# Patient Record
Sex: FEMALE | Race: WHITE | Employment: FULL TIME | URBAN - METROPOLITAN AREA
[De-identification: names, ages, dates, MRNs, and addresses within clinical notes are randomized per-mention and may not be internally consistent; named-entity substitution may affect disease eponyms.]

---

## 2017-06-07 ENCOUNTER — HOSPITAL ENCOUNTER (OUTPATIENT)
Dept: GENERAL RADIOLOGY | Age: 47
Discharge: HOME | End: 2017-06-07

## 2017-06-07 DIAGNOSIS — R05.9 COUGH: ICD-10-CM

## 2017-06-07 PROCEDURE — 71020 XR CHEST PA LAT: CPT

## 2017-12-11 ENCOUNTER — ANESTHESIA (OUTPATIENT)
Dept: MEDSURG UNIT | Age: 47
End: 2017-12-11
Payer: COMMERCIAL

## 2017-12-11 ENCOUNTER — ANESTHESIA EVENT (OUTPATIENT)
Dept: MEDSURG UNIT | Age: 47
End: 2017-12-11
Payer: COMMERCIAL

## 2017-12-11 ENCOUNTER — HOSPITAL ENCOUNTER (OUTPATIENT)
Age: 47
Setting detail: OUTPATIENT SURGERY
Discharge: HOME | End: 2017-12-11
Attending: OTOLARYNGOLOGY | Admitting: OTOLARYNGOLOGY
Payer: COMMERCIAL

## 2017-12-11 VITALS
HEART RATE: 76 BPM | BODY MASS INDEX: 21.09 KG/M2 | OXYGEN SATURATION: 96 % | HEIGHT: 63 IN | SYSTOLIC BLOOD PRESSURE: 103 MMHG | RESPIRATION RATE: 19 BRPM | TEMPERATURE: 98.4 F | DIASTOLIC BLOOD PRESSURE: 67 MMHG | WEIGHT: 119 LBS

## 2017-12-11 LAB
HCG UR QL: NEGATIVE
HGB BLD-MCNC: 13.8 G/DL (ref 11.5–16)

## 2017-12-11 PROCEDURE — 74011250636 HC RX REV CODE- 250/636: Performed by: ANESTHESIOLOGY

## 2017-12-11 PROCEDURE — 74011250637 HC RX REV CODE- 250/637

## 2017-12-11 PROCEDURE — 77030008355 HC SPLNT NSL EXT MEDT -B: Performed by: OTOLARYNGOLOGY

## 2017-12-11 PROCEDURE — 77030020782 HC GWN BAIR PAWS FLX 3M -B

## 2017-12-11 PROCEDURE — 77030020360 HC SOL INJ D10 LFCR 250ML BG LF: Performed by: OTOLARYNGOLOGY

## 2017-12-11 PROCEDURE — 74011000250 HC RX REV CODE- 250

## 2017-12-11 PROCEDURE — C1762 CONN TISS, HUMAN(INC FASCIA): HCPCS | Performed by: OTOLARYNGOLOGY

## 2017-12-11 PROCEDURE — 74011250636 HC RX REV CODE- 250/636

## 2017-12-11 PROCEDURE — 77030026438 HC STYL ET INTUB CARD -A: Performed by: NURSE ANESTHETIST, CERTIFIED REGISTERED

## 2017-12-11 PROCEDURE — 77030008684 HC TU ET CUF COVD -B: Performed by: NURSE ANESTHETIST, CERTIFIED REGISTERED

## 2017-12-11 PROCEDURE — 81025 URINE PREGNANCY TEST: CPT

## 2017-12-11 PROCEDURE — 76060000067 HC AMB SURG ANES 3.5 TO 4 HR: Performed by: OTOLARYNGOLOGY

## 2017-12-11 PROCEDURE — 74011000250 HC RX REV CODE- 250: Performed by: OTOLARYNGOLOGY

## 2017-12-11 PROCEDURE — 77030021668 HC NEB PREFIL KT VYRM -A

## 2017-12-11 PROCEDURE — 77030020269 HC MISC IMPL: Performed by: OTOLARYNGOLOGY

## 2017-12-11 PROCEDURE — 77030013079 HC BLNKT BAIR HGGR 3M -A: Performed by: NURSE ANESTHETIST, CERTIFIED REGISTERED

## 2017-12-11 PROCEDURE — 74011250636 HC RX REV CODE- 250/636: Performed by: OTOLARYNGOLOGY

## 2017-12-11 PROCEDURE — 76030000007 HC AMB SURG OR TIME 3.5 TO 4: Performed by: OTOLARYNGOLOGY

## 2017-12-11 PROCEDURE — 85018 HEMOGLOBIN: CPT

## 2017-12-11 PROCEDURE — 76210000036 HC AMBSU PH I REC 1.5 TO 2 HR: Performed by: OTOLARYNGOLOGY

## 2017-12-11 PROCEDURE — 77030002966 HC SUT PDS J&J -A: Performed by: OTOLARYNGOLOGY

## 2017-12-11 RX ORDER — SODIUM CHLORIDE 9 MG/ML
25 INJECTION, SOLUTION INTRAVENOUS CONTINUOUS
Status: DISCONTINUED | OUTPATIENT
Start: 2017-12-11 | End: 2017-12-11 | Stop reason: HOSPADM

## 2017-12-11 RX ORDER — CLINDAMYCIN PHOSPHATE 900 MG/50ML
900 INJECTION INTRAVENOUS ONCE
Status: COMPLETED | OUTPATIENT
Start: 2017-12-11 | End: 2017-12-11

## 2017-12-11 RX ORDER — SODIUM CHLORIDE 0.9 % (FLUSH) 0.9 %
5-10 SYRINGE (ML) INJECTION AS NEEDED
Status: DISCONTINUED | OUTPATIENT
Start: 2017-12-11 | End: 2017-12-11 | Stop reason: HOSPADM

## 2017-12-11 RX ORDER — OXYMETAZOLINE HCL 0.05 %
2 SPRAY, NON-AEROSOL (ML) NASAL
Status: DISCONTINUED | OUTPATIENT
Start: 2017-12-11 | End: 2017-12-11 | Stop reason: HOSPADM

## 2017-12-11 RX ORDER — GLYCOPYRROLATE 0.2 MG/ML
INJECTION INTRAMUSCULAR; INTRAVENOUS AS NEEDED
Status: DISCONTINUED | OUTPATIENT
Start: 2017-12-11 | End: 2017-12-11 | Stop reason: HOSPADM

## 2017-12-11 RX ORDER — SODIUM CHLORIDE, SODIUM LACTATE, POTASSIUM CHLORIDE, CALCIUM CHLORIDE 600; 310; 30; 20 MG/100ML; MG/100ML; MG/100ML; MG/100ML
1000 INJECTION, SOLUTION INTRAVENOUS CONTINUOUS
Status: DISCONTINUED | OUTPATIENT
Start: 2017-12-11 | End: 2017-12-11 | Stop reason: HOSPADM

## 2017-12-11 RX ORDER — OXYCODONE AND ACETAMINOPHEN 5; 325 MG/1; MG/1
1 TABLET ORAL
Status: CANCELLED | OUTPATIENT
Start: 2017-12-11

## 2017-12-11 RX ORDER — HYDROMORPHONE HYDROCHLORIDE 2 MG/ML
INJECTION, SOLUTION INTRAMUSCULAR; INTRAVENOUS; SUBCUTANEOUS AS NEEDED
Status: DISCONTINUED | OUTPATIENT
Start: 2017-12-11 | End: 2017-12-11 | Stop reason: HOSPADM

## 2017-12-11 RX ORDER — EPHEDRINE SULFATE 50 MG/ML
5 INJECTION, SOLUTION INTRAVENOUS AS NEEDED
Status: DISCONTINUED | OUTPATIENT
Start: 2017-12-11 | End: 2017-12-11 | Stop reason: HOSPADM

## 2017-12-11 RX ORDER — MORPHINE SULFATE 2 MG/ML
2 INJECTION, SOLUTION INTRAMUSCULAR; INTRAVENOUS
Status: CANCELLED | OUTPATIENT
Start: 2017-12-11

## 2017-12-11 RX ORDER — PHENYLEPHRINE HCL IN 0.9% NACL 0.4MG/10ML
SYRINGE (ML) INTRAVENOUS AS NEEDED
Status: DISCONTINUED | OUTPATIENT
Start: 2017-12-11 | End: 2017-12-11 | Stop reason: HOSPADM

## 2017-12-11 RX ORDER — ROCURONIUM BROMIDE 10 MG/ML
INJECTION, SOLUTION INTRAVENOUS AS NEEDED
Status: DISCONTINUED | OUTPATIENT
Start: 2017-12-11 | End: 2017-12-11 | Stop reason: HOSPADM

## 2017-12-11 RX ORDER — ONDANSETRON 8 MG/1
8 TABLET, ORALLY DISINTEGRATING ORAL
Qty: 5 TAB | Refills: 1 | Status: SHIPPED | OUTPATIENT
Start: 2017-12-11 | End: 2019-01-22

## 2017-12-11 RX ORDER — OXYCODONE HYDROCHLORIDE 5 MG/1
5 TABLET ORAL AS NEEDED
Status: DISCONTINUED | OUTPATIENT
Start: 2017-12-11 | End: 2017-12-11 | Stop reason: HOSPADM

## 2017-12-11 RX ORDER — SUCCINYLCHOLINE CHLORIDE 20 MG/ML
INJECTION INTRAMUSCULAR; INTRAVENOUS AS NEEDED
Status: DISCONTINUED | OUTPATIENT
Start: 2017-12-11 | End: 2017-12-11 | Stop reason: HOSPADM

## 2017-12-11 RX ORDER — MIDAZOLAM HYDROCHLORIDE 1 MG/ML
1 INJECTION, SOLUTION INTRAMUSCULAR; INTRAVENOUS AS NEEDED
Status: DISCONTINUED | OUTPATIENT
Start: 2017-12-11 | End: 2017-12-11 | Stop reason: HOSPADM

## 2017-12-11 RX ORDER — CLINDAMYCIN HYDROCHLORIDE 300 MG/1
300 CAPSULE ORAL 3 TIMES DAILY
Qty: 21 CAP | Refills: 0 | Status: SHIPPED | OUTPATIENT
Start: 2017-12-11 | End: 2017-12-18

## 2017-12-11 RX ORDER — SODIUM CHLORIDE 0.9 % (FLUSH) 0.9 %
5-10 SYRINGE (ML) INJECTION AS NEEDED
Status: CANCELLED | OUTPATIENT
Start: 2017-12-11

## 2017-12-11 RX ORDER — SODIUM CHLORIDE 0.9 % (FLUSH) 0.9 %
5-10 SYRINGE (ML) INJECTION EVERY 8 HOURS
Status: DISCONTINUED | OUTPATIENT
Start: 2017-12-11 | End: 2017-12-11 | Stop reason: HOSPADM

## 2017-12-11 RX ORDER — FENTANYL CITRATE 50 UG/ML
50 INJECTION, SOLUTION INTRAMUSCULAR; INTRAVENOUS AS NEEDED
Status: DISCONTINUED | OUTPATIENT
Start: 2017-12-11 | End: 2017-12-11 | Stop reason: HOSPADM

## 2017-12-11 RX ORDER — SODIUM CHLORIDE 0.9 % (FLUSH) 0.9 %
5-10 SYRINGE (ML) INJECTION EVERY 8 HOURS
Status: CANCELLED | OUTPATIENT
Start: 2017-12-11

## 2017-12-11 RX ORDER — SODIUM CHLORIDE, SODIUM LACTATE, POTASSIUM CHLORIDE, CALCIUM CHLORIDE 600; 310; 30; 20 MG/100ML; MG/100ML; MG/100ML; MG/100ML
100 INJECTION, SOLUTION INTRAVENOUS CONTINUOUS
Status: DISCONTINUED | OUTPATIENT
Start: 2017-12-11 | End: 2017-12-11 | Stop reason: HOSPADM

## 2017-12-11 RX ORDER — DEXAMETHASONE SODIUM PHOSPHATE 4 MG/ML
8 INJECTION, SOLUTION INTRA-ARTICULAR; INTRALESIONAL; INTRAMUSCULAR; INTRAVENOUS; SOFT TISSUE ONCE
Status: COMPLETED | OUTPATIENT
Start: 2017-12-11 | End: 2017-12-11

## 2017-12-11 RX ORDER — MORPHINE SULFATE 10 MG/ML
2 INJECTION, SOLUTION INTRAMUSCULAR; INTRAVENOUS
Status: DISCONTINUED | OUTPATIENT
Start: 2017-12-11 | End: 2017-12-11 | Stop reason: HOSPADM

## 2017-12-11 RX ORDER — ONDANSETRON 2 MG/ML
INJECTION INTRAMUSCULAR; INTRAVENOUS AS NEEDED
Status: DISCONTINUED | OUTPATIENT
Start: 2017-12-11 | End: 2017-12-11 | Stop reason: HOSPADM

## 2017-12-11 RX ORDER — SCOLOPAMINE TRANSDERMAL SYSTEM 1 MG/1
PATCH, EXTENDED RELEASE TRANSDERMAL AS NEEDED
Status: DISCONTINUED | OUTPATIENT
Start: 2017-12-11 | End: 2017-12-11 | Stop reason: HOSPADM

## 2017-12-11 RX ORDER — DIPHENHYDRAMINE HYDROCHLORIDE 50 MG/ML
12.5 INJECTION, SOLUTION INTRAMUSCULAR; INTRAVENOUS AS NEEDED
Status: DISCONTINUED | OUTPATIENT
Start: 2017-12-11 | End: 2017-12-11 | Stop reason: HOSPADM

## 2017-12-11 RX ORDER — MIDAZOLAM HYDROCHLORIDE 1 MG/ML
0.5 INJECTION, SOLUTION INTRAMUSCULAR; INTRAVENOUS
Status: DISCONTINUED | OUTPATIENT
Start: 2017-12-11 | End: 2017-12-11 | Stop reason: HOSPADM

## 2017-12-11 RX ORDER — ROPIVACAINE HYDROCHLORIDE 5 MG/ML
150 INJECTION, SOLUTION EPIDURAL; INFILTRATION; PERINEURAL AS NEEDED
Status: DISCONTINUED | OUTPATIENT
Start: 2017-12-11 | End: 2017-12-11 | Stop reason: HOSPADM

## 2017-12-11 RX ORDER — ACETAMINOPHEN 10 MG/ML
INJECTION, SOLUTION INTRAVENOUS AS NEEDED
Status: DISCONTINUED | OUTPATIENT
Start: 2017-12-11 | End: 2017-12-11 | Stop reason: HOSPADM

## 2017-12-11 RX ORDER — LIDOCAINE HYDROCHLORIDE 10 MG/ML
0.1 INJECTION, SOLUTION EPIDURAL; INFILTRATION; INTRACAUDAL; PERINEURAL AS NEEDED
Status: DISCONTINUED | OUTPATIENT
Start: 2017-12-11 | End: 2017-12-11 | Stop reason: HOSPADM

## 2017-12-11 RX ORDER — FENTANYL CITRATE 50 UG/ML
INJECTION, SOLUTION INTRAMUSCULAR; INTRAVENOUS AS NEEDED
Status: DISCONTINUED | OUTPATIENT
Start: 2017-12-11 | End: 2017-12-11 | Stop reason: HOSPADM

## 2017-12-11 RX ORDER — LIDOCAINE HYDROCHLORIDE 20 MG/ML
INJECTION, SOLUTION EPIDURAL; INFILTRATION; INTRACAUDAL; PERINEURAL AS NEEDED
Status: DISCONTINUED | OUTPATIENT
Start: 2017-12-11 | End: 2017-12-11 | Stop reason: HOSPADM

## 2017-12-11 RX ORDER — PROPOFOL 10 MG/ML
INJECTION, EMULSION INTRAVENOUS AS NEEDED
Status: DISCONTINUED | OUTPATIENT
Start: 2017-12-11 | End: 2017-12-11 | Stop reason: HOSPADM

## 2017-12-11 RX ORDER — LIDOCAINE HYDROCHLORIDE AND EPINEPHRINE 10; 10 MG/ML; UG/ML
INJECTION, SOLUTION INFILTRATION; PERINEURAL AS NEEDED
Status: DISCONTINUED | OUTPATIENT
Start: 2017-12-11 | End: 2017-12-11 | Stop reason: HOSPADM

## 2017-12-11 RX ORDER — ONDANSETRON 2 MG/ML
4 INJECTION INTRAMUSCULAR; INTRAVENOUS AS NEEDED
Status: DISCONTINUED | OUTPATIENT
Start: 2017-12-11 | End: 2017-12-11 | Stop reason: HOSPADM

## 2017-12-11 RX ORDER — MIDAZOLAM HYDROCHLORIDE 1 MG/ML
INJECTION, SOLUTION INTRAMUSCULAR; INTRAVENOUS AS NEEDED
Status: DISCONTINUED | OUTPATIENT
Start: 2017-12-11 | End: 2017-12-11 | Stop reason: HOSPADM

## 2017-12-11 RX ORDER — BACITRACIN ZINC 500 UNIT/G
OINTMENT (GRAM) TOPICAL AS NEEDED
Status: DISCONTINUED | OUTPATIENT
Start: 2017-12-11 | End: 2017-12-11 | Stop reason: HOSPADM

## 2017-12-11 RX ORDER — FENTANYL CITRATE 50 UG/ML
25 INJECTION, SOLUTION INTRAMUSCULAR; INTRAVENOUS
Status: DISCONTINUED | OUTPATIENT
Start: 2017-12-11 | End: 2017-12-11 | Stop reason: HOSPADM

## 2017-12-11 RX ORDER — SODIUM CHLORIDE, SODIUM LACTATE, POTASSIUM CHLORIDE, CALCIUM CHLORIDE 600; 310; 30; 20 MG/100ML; MG/100ML; MG/100ML; MG/100ML
INJECTION, SOLUTION INTRAVENOUS
Status: DISCONTINUED | OUTPATIENT
Start: 2017-12-11 | End: 2017-12-11 | Stop reason: HOSPADM

## 2017-12-11 RX ORDER — OXYCODONE AND ACETAMINOPHEN 5; 325 MG/1; MG/1
2 TABLET ORAL
Qty: 30 TAB | Refills: 0 | Status: SHIPPED | OUTPATIENT
Start: 2017-12-11 | End: 2019-01-22

## 2017-12-11 RX ORDER — HYDROCODONE BITARTRATE AND ACETAMINOPHEN 5; 325 MG/1; MG/1
2 TABLET ORAL
Qty: 30 TAB | Refills: 0 | Status: SHIPPED | OUTPATIENT
Start: 2017-12-11 | End: 2019-01-22

## 2017-12-11 RX ORDER — CEFAZOLIN SODIUM/WATER 2 G/20 ML
2 SYRINGE (ML) INTRAVENOUS ONCE
Status: DISCONTINUED | OUTPATIENT
Start: 2017-12-11 | End: 2017-12-11

## 2017-12-11 RX ADMIN — GLYCOPYRROLATE 0.2 MG: 0.2 INJECTION INTRAMUSCULAR; INTRAVENOUS at 14:01

## 2017-12-11 RX ADMIN — ROCURONIUM BROMIDE 5 MG: 10 INJECTION, SOLUTION INTRAVENOUS at 13:18

## 2017-12-11 RX ADMIN — Medication 40 MCG: at 14:14

## 2017-12-11 RX ADMIN — SUCCINYLCHOLINE CHLORIDE 160 MG: 20 INJECTION INTRAMUSCULAR; INTRAVENOUS at 13:18

## 2017-12-11 RX ADMIN — ROCURONIUM BROMIDE 10 MG: 10 INJECTION, SOLUTION INTRAVENOUS at 14:43

## 2017-12-11 RX ADMIN — ROCURONIUM BROMIDE 10 MG: 10 INJECTION, SOLUTION INTRAVENOUS at 14:55

## 2017-12-11 RX ADMIN — HYDROMORPHONE HYDROCHLORIDE 0.4 MG: 2 INJECTION, SOLUTION INTRAMUSCULAR; INTRAVENOUS; SUBCUTANEOUS at 16:59

## 2017-12-11 RX ADMIN — Medication 40 MCG: at 14:55

## 2017-12-11 RX ADMIN — PROPOFOL 50 MG: 10 INJECTION, EMULSION INTRAVENOUS at 14:44

## 2017-12-11 RX ADMIN — ROCURONIUM BROMIDE 10 MG: 10 INJECTION, SOLUTION INTRAVENOUS at 16:10

## 2017-12-11 RX ADMIN — PROPOFOL 200 MG: 10 INJECTION, EMULSION INTRAVENOUS at 13:18

## 2017-12-11 RX ADMIN — Medication 80 MCG: at 14:00

## 2017-12-11 RX ADMIN — ROCURONIUM BROMIDE 10 MG: 10 INJECTION, SOLUTION INTRAVENOUS at 15:19

## 2017-12-11 RX ADMIN — SODIUM CHLORIDE, SODIUM LACTATE, POTASSIUM CHLORIDE, AND CALCIUM CHLORIDE 1000 ML: 600; 310; 30; 20 INJECTION, SOLUTION INTRAVENOUS at 12:30

## 2017-12-11 RX ADMIN — HYDROMORPHONE HYDROCHLORIDE 0.4 MG: 2 INJECTION, SOLUTION INTRAMUSCULAR; INTRAVENOUS; SUBCUTANEOUS at 17:10

## 2017-12-11 RX ADMIN — ROCURONIUM BROMIDE 10 MG: 10 INJECTION, SOLUTION INTRAVENOUS at 15:44

## 2017-12-11 RX ADMIN — PROPOFOL 50 MG: 10 INJECTION, EMULSION INTRAVENOUS at 16:36

## 2017-12-11 RX ADMIN — Medication 40 MCG: at 14:39

## 2017-12-11 RX ADMIN — HYDROMORPHONE HYDROCHLORIDE 0.4 MG: 2 INJECTION, SOLUTION INTRAMUSCULAR; INTRAVENOUS; SUBCUTANEOUS at 14:43

## 2017-12-11 RX ADMIN — SODIUM CHLORIDE, SODIUM LACTATE, POTASSIUM CHLORIDE, CALCIUM CHLORIDE: 600; 310; 30; 20 INJECTION, SOLUTION INTRAVENOUS at 13:06

## 2017-12-11 RX ADMIN — Medication 40 MCG: at 16:11

## 2017-12-11 RX ADMIN — Medication 40 MCG: at 16:10

## 2017-12-11 RX ADMIN — Medication 40 MCG: at 15:19

## 2017-12-11 RX ADMIN — LIDOCAINE HYDROCHLORIDE 40 MG: 20 INJECTION, SOLUTION EPIDURAL; INFILTRATION; INTRACAUDAL; PERINEURAL at 13:17

## 2017-12-11 RX ADMIN — SODIUM CHLORIDE, SODIUM LACTATE, POTASSIUM CHLORIDE, CALCIUM CHLORIDE: 600; 310; 30; 20 INJECTION, SOLUTION INTRAVENOUS at 15:53

## 2017-12-11 RX ADMIN — FENTANYL CITRATE 50 MCG: 50 INJECTION, SOLUTION INTRAMUSCULAR; INTRAVENOUS at 13:13

## 2017-12-11 RX ADMIN — ACETAMINOPHEN 1000 MG: 10 INJECTION, SOLUTION INTRAVENOUS at 13:37

## 2017-12-11 RX ADMIN — Medication 40 MCG: at 16:35

## 2017-12-11 RX ADMIN — PROPOFOL 50 MG: 10 INJECTION, EMULSION INTRAVENOUS at 16:43

## 2017-12-11 RX ADMIN — ROCURONIUM BROMIDE 10 MG: 10 INJECTION, SOLUTION INTRAVENOUS at 14:03

## 2017-12-11 RX ADMIN — HYDROMORPHONE HYDROCHLORIDE 0.4 MG: 2 INJECTION, SOLUTION INTRAMUSCULAR; INTRAVENOUS; SUBCUTANEOUS at 17:06

## 2017-12-11 RX ADMIN — ONDANSETRON 4 MG: 2 INJECTION INTRAMUSCULAR; INTRAVENOUS at 16:39

## 2017-12-11 RX ADMIN — CLINDAMYCIN PHOSPHATE 900 MG: 18 INJECTION, SOLUTION INTRAMUSCULAR; INTRAVENOUS at 13:31

## 2017-12-11 RX ADMIN — ONDANSETRON 4 MG: 2 INJECTION INTRAMUSCULAR; INTRAVENOUS at 13:27

## 2017-12-11 RX ADMIN — DEXAMETHASONE SODIUM PHOSPHATE 10 MG: 4 INJECTION INTRA-ARTICULAR; INTRALESIONAL; INTRAMUSCULAR; INTRAVENOUS; SOFT TISSUE at 13:27

## 2017-12-11 RX ADMIN — SCOLOPAMINE TRANSDERMAL SYSTEM 1 PATCH: 1 PATCH, EXTENDED RELEASE TRANSDERMAL at 12:55

## 2017-12-11 RX ADMIN — MIDAZOLAM HYDROCHLORIDE 2 MG: 1 INJECTION, SOLUTION INTRAMUSCULAR; INTRAVENOUS at 13:00

## 2017-12-11 RX ADMIN — FENTANYL CITRATE 50 MCG: 50 INJECTION, SOLUTION INTRAMUSCULAR; INTRAVENOUS at 13:06

## 2017-12-11 RX ADMIN — Medication 80 MCG: at 15:50

## 2017-12-11 RX ADMIN — HYDROMORPHONE HYDROCHLORIDE 0.4 MG: 2 INJECTION, SOLUTION INTRAMUSCULAR; INTRAVENOUS; SUBCUTANEOUS at 15:52

## 2017-12-11 NOTE — DISCHARGE INSTRUCTIONS
Nasal Septum Repair: What to Expect at [address] may have some swelling of your nose, upper lip, cheeks, or around your eyes after nasal surgery. You may have some bruises around your nose and eyes. Your nose may be sore and will bleed. This may last for several days after surgery. The tip of your nose and your upper lip and gums may be numb. Feeling will return in a few weeks to a few months. Your sense of smell may not be as good after surgery. But it will improve and will often return to normal in 1 to 2 months. You will have a drip pad under your nose to collect mucus and blood. Change it only when it bleeds through. You may have to do this every hour for 24 hours after surgery. You will probably be able to return to work or school in a few days and to your normal routine in about 3 weeks. But this varies with your job and how much surgery you had. Most people recover fully in 1 to 2 months. You will have to visit your doctor during the 3 to 4 months after your surgery. Your doctor will check to see that your nose is healing well. This care sheet gives you a general idea about how long it will take for you to recover. But each person recovers at a different pace. Follow the steps below to get better as quickly as possible. How can you care for yourself at home? Activity  ? · Rest when you feel tired. Getting enough sleep will help you recover. Do not lie flat. Raise your head with two or three pillows. This can reduce swelling. Try to sleep on your back for the month after surgery. You can also sleep in a reclining chair. ? · Try to walk each day. Start by walking a little more than you did the day before. Bit by bit, increase the amount you walk. Walking boosts blood flow and helps prevent pneumonia and constipation. Also, try to sit and stand as much as you can.   ? · For 1 week, try not to bend over or lift anything heavier than 10 pounds.  This may include a child, heavy grocery bags and milk containers, a heavy briefcase or backpack, cat litter or dog food bags, or a vacuum . ? · You can take a shower or bath. Avoid swimming for 6 weeks. ? · Avoid strenuous activities, such as bicycle riding, jogging, weight lifting, or aerobic exercise, for 1 week or until your doctor says it is okay. ? · You may drive when you are no longer taking prescription pain pills and feel up to it. Diet  ? · You can eat your normal diet. If your stomach is upset, try bland, low-fat foods like plain rice, broiled chicken, toast, and yogurt. ? · You may notice that your bowel movements are not regular right after your surgery. This is common. Try to avoid constipation and straining with bowel movements. You may want to take a fiber supplement every day. If you have not had a bowel movement after a couple of days, ask your doctor about taking a mild laxative. Medicines  ? · Your doctor will tell you if and when you can restart your medicines. He or she will also give you instructions about taking any new medicines. ? · If you take blood thinners, such as warfarin (Coumadin), clopidogrel (Plavix), or aspirin, be sure to talk to your doctor. He or she will tell you if and when to start taking those medicines again. Make sure that you understand exactly what your doctor wants you to do. ? · Do not take aspirin, aspirin-containing medicines, or anti-inflammatory medicines such as ibuprofen (Advil, Motrin) or naproxen (Aleve) for 3 weeks following surgery unless your doctor says it is okay. ? · Take pain medicines exactly as directed. ¨ If the doctor gave you a prescription medicine for pain, take it as prescribed. ¨ Do not take two or more pain medicines at the same time unless the doctor told you to. Many pain medicines have acetaminophen, which is Tylenol. Too much acetaminophen (Tylenol) can be harmful. ? · If your doctor prescribed antibiotics, take them as directed.  Do not stop taking them just because you feel better. You need to take the full course of antibiotics. ? · If you think your pain medicine is making you sick to your stomach:  ¨ Take your medicine after meals (unless your doctor has told you not to). ¨ Ask your doctor for a different pain medicine. Incision care  ? · You will have a drip pad under your nose to collect blood. Change it only when it has bled through. You may have to do this every hour for 24 hours after surgery. When bleeding stops, you can remove it. ? · If you have packing in your nose, leave it in. Your doctor will take it out. Ice and elevation  ? · To help with swelling and pain, put ice or a cold pack on your nose for 10 to 20 minutes at a time. Put a thin cloth between the ice and your skin. ? · Sleep with your head raised up. You can also sleep in a reclining chair. Other instructions  ? · Do not blow your nose for 1 week after surgery. ? · Do not put anything into your nose. ? · If you must sneeze, open your mouth and sneeze naturally. ? · After any packing is removed, use saline (saltwater) nasal washes to help keep your nasal passages open and wash out mucus and bacteria. You can buy saline nose drops at a grocery store or drugstore. Or you can make your own at home by adding 1 teaspoon of salt and 1 teaspoon of baking soda to 2 cups of distilled water. If you make your own, fill a bulb syringe with the solution, insert the tip into your nostril, and squeeze gently. Sidonie Belle your nose. ? · You can wear your glasses when you wish. Do not wear contacts until the day after the surgery. Follow-up care is a key part of your treatment and safety. Be sure to make and go to all appointments, and call your doctor if you are having problems. It's also a good idea to know your test results and keep a list of the medicines you take. When should you call for help? Call 911 anytime you think you may need emergency care.  For example, call if:  ? · You passed out (lost consciousness). ? · You have severe trouble breathing. ?Call your doctor now or seek immediate medical care if:  ? · You have bleeding through the nasal packing that is not slowing. ? · You have symptoms of infection, such as:  ¨ Increased pain, swelling, warmth, or redness. ¨ Red streaks coming from the area. ¨ Pus draining from the area. ¨ A fever. ? · You have pain that does not get better after you take pain pills. ? Watch closely for any changes in your health, and be sure to contact your doctor if:  ? · You do not get better as expected. Where can you learn more? Go to http://janet-shay.info/. Enter P581 in the search box to learn more about \"Nasal Septum Repair: What to Expect at Home. \"  Current as of: May 12, 2017  Content Version: 11.4  © 6526-0776 [employer]. Care instructions adapted under license by [facility] (which disclaims liability or warranty for this information). If you have questions about a medical condition or this instruction, always ask your healthcare professional. [place] [address] any warranty or liability for your use of this information. Rhinoplasty: What to Expect at [address] [place] [address] is surgery to reshape your nose. It can be done to improve your appearance, fix a birth defect, or help you breathe better. You may have stitches or staples in your cuts (incisions). Stitches inside your nose and mouth will usually dissolve on their own. If you have staples, your doctor will take these out in the first week. A bandage will cover your nose. You may have a plastic or plaster splint to protect and help keep the new shape of your nose. You may have a \"nasal drip pad\" under your nostrils to collect any blood that may drip from your nose. Your doctor will show you how to change the pad as needed. You may have packing material inside your nose to reduce bleeding and swelling. Packing and the nasal drip pad will be removed within 2 days after surgery. The splint will be removed in about a week. Your nose will be bruised and swollen, and you may get dark bruises around your eyes. The swelling may get worse before it gets better. Most of the swelling should go away in 3 to 4 weeks. You will have some pain in your nose, and you may have a headache. Your nose may be stuffy and you may have trouble breathing for a short time. The skin on the tip of your nose may be numb. You may have some itching or shooting pain as the feeling returns. If bones were broken during your surgery, you will need to avoid injury to your nose for about 3 months. In 3 to 4 weeks, you should have a good idea as to what your nose will look like. It can take up to a year to see the final result. This care sheet gives you a general idea about how long it will take for you to recover. But each person recovers at a different pace. Follow the steps below to feel better as quickly as possible. How can you care for yourself at home? Activity  ? · Rest when you feel tired. Getting enough sleep will help you recover. ? · Keep your head raised for several days after surgery. Sleep with your head up by using 2 or 3 pillows. ? · Try to walk each day. Start by walking a little more than you did the day before. Bit by bit, increase the amount you walk. ? · You will probably need to take about 1 week off from work. It depends on the type of work you do and how you feel. ? · Avoid strenuous activities, such as bicycle riding, jogging, weight lifting, or aerobic exercise, for 2 to 3 weeks or until your doctor says it is okay. ? · Ask your doctor when you can drive again. ? · Do not blow your nose for at least 1 week after surgery. Wipe your nose gently with a tissue. If you need to sneeze, do it with your mouth open. ? · Ask your doctor when it'll be okay for you to bend over. ? · Do not rub your nose for 8 weeks. Use sunblock on your nose and wear a hat with a brim to avoid getting a sunburn. Put on sunblock or makeup gently. ? · Do not swim for a week. Diet  ? · You may notice that your bowel movements are not regular right after your surgery. This is common. Try to avoid constipation and straining with bowel movements. You may want to take a fiber supplement every day. If you have not had a bowel movement after a couple of days, ask your doctor about taking a mild laxative. Medicines  ? · Your doctor will tell you if and when you can restart your medicines. He or she will also give you instructions about taking any new medicines. ? · If you take blood thinners, such as warfarin (Coumadin), clopidogrel (Plavix), or aspirin, be sure to talk to your doctor. He or she will tell you if and when to start taking those medicines again. Make sure that you understand exactly what your doctor wants you to do. ? · Be safe with medicines. Take pain medicines exactly as directed. ¨ If the doctor gave you a prescription medicine for pain, take it as prescribed. ¨ If you are not taking a prescription pain medicine, ask your doctor if you can take an over-the-counter medicine. ? · If your doctor prescribed antibiotics, take them as directed. Do not stop taking them just because you feel better. You need to take the full course of antibiotics. ? · If you think your pain medicine is making you sick to your stomach:  ¨ Take your medicine after meals (unless your doctor has told you not to). ¨ Ask your doctor for a different pain medicine. Incision care  ? · After the stitches or staples are out, you may wash the incision with soap and water and gently dry the area. ? · If you have strips of tape on the incision the doctor made, leave the tape on for a week or until it falls off. Or follow your doctor's instructions for removing the tape. Other instructions  ?  · Put ice or a cold pack on your nose for 10 to 20 minutes at a time. Try to do this every 1 to 2 hours for the next 3 days (when you are awake) or until the swelling goes down. A bag of frozen peas or corn works well for this, because it molds to the shape of your face. Put a thin cloth between the ice and your skin. ? · Do not set glasses on your nose for 4 weeks. Instead, wrap a piece of tape around the bridge of the glasses and attach the tape to your forehead. ? · For 1 week, avoid wearing clothes that you pull over your head. Follow-up care is a key part of your treatment. Be sure to make and go to all appointments, and call your doctor if you are having problems. It's also a good idea to know your test results and keep a list of the medicines you take. When should you call for help? Call 911 anytime you think you may need emergency care. For example, call if:  ? · You passed out (lost consciousness). ? · You have severe trouble breathing. ? · You have sudden chest pain and shortness of breath, or you cough up blood. ?Call your doctor now or seek immediate medical care if:  ? · You have pain that does not get better after you take pain medicine. ? · You have symptoms of a blood clot in your leg (called a deep vein thrombosis), such as:  ¨ Pain in the calf, back of the knee, thigh, or groin. ¨ Redness and swelling in your leg or groin. ? · You are bleeding from the incision. ? · You have loose stitches, or your incision comes open. ? · You have symptoms of infection, such as:  ¨ Increased pain, swelling, warmth, or redness. ¨ Red streaks leading from the incision. ¨ Pus draining from the incision. ¨ A fever. ? · You are sick to your stomach or cannot keep fluids down. ? Watch closely for any changes in your health, and be sure to contact your doctor if:  ? · You do not get better as expected. Where can you learn more? Go to http://janet-shay.info/.   Enter L157 in the search box to learn more about \"Rhinoplasty: What to Expect at Home. \"  Current as of: October 13, 2016  Content Version: 11.4  © 4157-8986 [employer]. Care instructions adapted under license by [employer] (which disclaims liability or warranty for this information). If you have questions about a medical condition or this instruction, always ask your healthcare professional. [address] any warranty or liability for your use of this information. DISCHARGE SUMMARY from Nurse    1) 1000 mg of Tylenol was given thru the IV at 1:30 pm. No Tylenol products until after 7:30 pm. Your pain medication contains Tylenol. Do not exceed 4000 mg of Acetaminophen / Tylenol in a 24 hour period. 2) Scopolamine patch can stay on for a maximum of 72 hours. A common side effect is dryness of the mouth and light headiness. Try to keep the patch on for at least 24 hours to reduce the side effects of nausea from your anesthesia. When removing the patch, wipe behind your ear where the patch was to remove any excess medication. Please make sure not to touch your eyes after handling the patch as it may dilate your pupils. Immediately wash your hands after handling the patch. 3) 900 mg of Cleocin which is an antibiotic was given thru your IV. So start your antibiotic tomorrow as you are covered for today. PATIENT INSTRUCTIONS:    After general anesthesia or intravenous sedation, for 24 hours or while taking prescription Narcotics:  · Limit your activities  · Do not drive and operate hazardous machinery  · Do not make important personal or business decisions  · Do  not drink alcoholic beverages  · If you have not urinated within 8 hours after discharge, please contact your surgeon on call.     Report the following to your surgeon:  · Excessive pain, swelling, redness or odor of or around the surgical area  · Temperature over 100.5  · Nausea and vomiting lasting longer than 4 hours or if unable to take medications  · Any signs of decreased circulation or nerve impairment to extremity: change in color, persistent  numbness, tingling, coldness or increase pain  · Any questions    What to do at Home:  Recommended activity: Activity as tolerated and no driving for today and No driving while on analgesics,     If you experience any of the following symptoms ; as noted earlier, please follow up with . *  Please give a list of your current medications to your Primary Care Provider. *  Please update this list whenever your medications are discontinued, doses are      changed, or new medications (including over-the-counter products) are added. *  Please carry medication information at all times in case of emergency situations. These are general instructions for a healthy lifestyle:    No smoking/ No tobacco products/ Avoid exposure to second hand smoke  Surgeon General's Warning:  Quitting smoking now greatly reduces serious risk to your health. Obesity, smoking, and sedentary lifestyle greatly increases your risk for illness    A healthy diet, regular physical exercise & weight monitoring are important for maintaining a healthy lifestyle    You may be retaining fluid if you have a history of heart failure or if you experience any of the following symptoms:  Weight gain of 3 pounds or more overnight or 5 pounds in a week, increased swelling in our hands or feet or shortness of breath while lying flat in bed. Please call your doctor as soon as you notice any of these symptoms; do not wait until your next office visit. Recognize signs and symptoms of STROKE:    F-face looks uneven    A-arms unable to move or move unevenly    S-speech slurred or non-existent    T-time-call 911 as soon as signs and symptoms begin-DO NOT go       Back to bed or wait to see if you get better-TIME IS BRAIN. Warning Signs of HEART ATTACK     Call 911 if you have these symptoms:   Chest discomfort.  Most heart attacks involve discomfort in the center of the chest that lasts more than a few minutes, or that goes away and comes back. It can feel like uncomfortable pressure, squeezing, fullness, or pain.  Discomfort in other areas of the upper body. Symptoms can include pain or discomfort in one or both arms, the back, neck, jaw, or stomach.  Shortness of breath with or without chest discomfort.  Other signs may include breaking out in a cold sweat, nausea, or lightheadedness. Don't wait more than five minutes to call 911 - MINUTES MATTER! Fast action can save your life. Calling 911 is almost always the fastest way to get lifesaving treatment. Emergency Medical Services staff can begin treatment when they arrive -- up to an hour sooner than if someone gets to the hospital by car. The discharge information has been reviewed with the patient and caregiver. The patient and caregiver verbalized understanding. Discharge medications reviewed with the patient and caregiver and appropriate educational materials and side effects teaching were provided.   ___________________________________________________________________________________________________________________________________

## 2017-12-11 NOTE — ROUTINE PROCESS
Patient: Nakita August MRN: [id number]  SSN: [id number]   YOB: 1970  Age: 41 y.o. Sex: female     Patient is status post Procedure(s):  MAJOR REVISION OF RHINOPLASTY, GRAFT RIB CARTILAGE TO NOSE, REPAIR NASAL VESTIBULAR STENOSIS. Surgeon(s) and Role:     * Sarbjit Peter MD - Primary    Local/Dose/Irrigation:                   Peripheral IV 12/11/17 Right Hand (Active)   Site Assessment Clean, dry, & intact 12/11/2017 12:55 PM   Phlebitis Assessment 0 12/11/2017 12:55 PM   Infiltration Assessment 0 12/11/2017 12:55 PM   Dressing Status Clean, dry, & intact 12/11/2017 12:55 PM   Dressing Type Transparent;Tape 12/11/2017 12:55 PM   Hub Color/Line Status Pink; Infusing 12/11/2017 12:55 PM                           Dressing/Packing:  Wound Face-DRESSING TYPE:  (mastisol, sterstrips, thermosplint) (12/11/17 1100)  Wound Ear Right-DRESSING TYPE:  (bacitracin ointment, cotton balls) (12/11/17 1100)  Splint/Cast:  ]    Other:

## 2017-12-11 NOTE — H&P
History and Physical    Subjective:     Mary Sexton is a 41 y.o.  female who presents with nasoseptal twist deformity and nasal obstruction not amenable to allergy and medical mx. Past Medical History:   Diagnosis Date    Arrhythmia       Past Surgical History:   Procedure Laterality Date    HX HEENT  2016    Dr. Di Post repaired \"perforation in cartiledge\"    HX OTHER SURGICAL  1998    Bilateral Breast Augmentation    HX RHINOPLASTY  1998    Rhinoplasty     History reviewed. No pertinent family history. Social History   Substance Use Topics    Smoking status: Never Smoker    Smokeless tobacco: Never Used    Alcohol use Yes      Comment: 1/month       Prior to Admission medications    Medication Sig Start Date End Date Taking? Authorizing Provider   VALACYCLOVIR HCL (VALTREX PO) Take  by mouth as needed. Yes Historical Provider   OTHER daily. BCP-    Yes Historical Provider   oxyCODONE-acetaminophen (PERCOCET) 5-325 mg per tablet Take 1-2 Tabs by mouth every six (6) hours as needed for Pain. Max Daily Amount: 8 Tabs. 10/12/16   José Manuel Munoz PA-C   bacitracin (BACITRACIN) ointment Apply  to affected area two (2) times a day. 9/12/16   Moi Dan MD   ondansetron (ZOFRAN ODT) 8 mg disintegrating tablet Take 1 Tab by mouth every eight (8) hours as needed for Nausea. 9/12/16   Moi Dan MD     Allergies   Allergen Reactions    Penicillins Other (comments)     SWELLING THROAT        Review of Systems:  A comprehensive review of systems was negative except for that written in the History of Present Illness. Objective: Intake and Output:            Physical Exam:   Visit Vitals    /66 (BP 1 Location: Right arm, BP Patient Position: At rest;Sitting)    Pulse 69    Temp 98.2 °F (36.8 °C)    Resp 16    Ht 5' 3\" (1.6 m)    Wt 54 kg (119 lb)    LMP 12/11/2011    SpO2 99%    BMI 21.08 kg/m2     General:  Alert, cooperative, no distress, appears stated age.    Head: Normocephalic, without obvious abnormality, atraumatic. Eyes:  Conjunctivae/corneas clear. PERRL, EOMs intact. Fundi benign. Ears:  Normal TMs and external ear canals both ears. Nose: NSD  Turb hypertrophy  Valve collapse with ULC dislocation  Crooked right Columella show and alar retraction   Throat: Lips, mucosa, and tongue normal. Teeth and gums normal.   Neck: Supple, symmetrical, trachea midline, no adenopathy, thyroid: no enlargement/tenderness/nodules, no carotid bruit and no JVD. Back:   Symmetric, no curvature. ROM normal. No CVA tenderness. Lungs:   Clear to auscultation bilaterally. Chest wall:  No tenderness or deformity. Heart:  Regular rate and rhythm, S1, S2 normal, no murmur, click, rub or gallop. Extremities: Extremities normal, atraumatic, no cyanosis or edema. Pulses: 2+ and symmetric all extremities. Skin: Skin color, texture, turgor normal. No rashes or lesions. Lymph nodes: Cervical, supraclavicular, and axillary nodes normal.   Neurologic: CNII-XII intact. Normal strength, sensation and reflexes throughout.            Data Review: Recent Results (from the past 24 hour(s))   HCG URINE, QL. - POC    Collection Time: 12/11/17 12:49 PM   Result Value Ref Range    Pregnancy test,urine (POC) NEGATIVE  NEG     POC HEMOGLOBIN    Collection Time: 12/11/17 12:49 PM   Result Value Ref Range    Hemoglobin (POC) 13.8 11.5 - 16.0           Assessment:     NSD    Plan:     Valve repair SRP turbinate reduction  Costal cart allograft  Auricular graft    Signed By: Christel Herman MD     December 11, 2017

## 2017-12-11 NOTE — IP AVS SNAPSHOT
[address] 
[phone] Patient: Ruben Serrano MRN: [id number] POA:9/23/4603 About your hospitalization You were admitted on:  December 11, 2017 You last received care in the:  [facility] PACU You were discharged on:  December 11, 2017 Why you were hospitalized Your primary diagnosis was:  Not on File Things You Need To Do (next 8 weeks) Follow up with Gertrude Baer MD  
  
Phone:  [phone] Where:  [facility] [phone] [id number] Discharge Orders None A check lennie indicates which time of day the medication should be taken. My Medications TAKE these medications as instructed Instructions Each Dose to Equal  
 Morning Noon Evening Bedtime  
 bacitracin zinc ointment Commonly known as:  BACITRACIN Your last dose was: Your next dose is:    
   
   
 Apply  to affected area two (2) times a day. clindamycin 300 mg capsule Commonly known as:  CLEOCIN Your last dose was: Your next dose is: Take 1 Cap by mouth three (3) times daily for 7 days. 300 mg HYDROcodone-acetaminophen 5-325 mg per tablet Commonly known as:  Malu Peter Your last dose was: Your next dose is: Take 2 Tabs by mouth every six (6) hours as needed for Pain for up to 30 doses. Max Daily Amount: 8 Tabs. Indications: Pain 2 Tab * ondansetron 8 mg disintegrating tablet Commonly known as:  ZOFRAN ODT Your last dose was: Your next dose is: Take 1 Tab by mouth every eight (8) hours as needed for Nausea. 8 mg * ondansetron 8 mg disintegrating tablet Commonly known as:  ZOFRAN ODT Your last dose was: Your next dose is: Take 1 Tab by mouth every eight (8) hours as needed for Nausea. 8 mg OTHER Your last dose was: Your next dose is:    
   
   
 daily. BCP-  
     
   
   
   
  
 * oxyCODONE-acetaminophen 5-325 mg per tablet Commonly known as:  PERCOCET Your last dose was: Your next dose is: Take 1-2 Tabs by mouth every six (6) hours as needed for Pain. Max Daily Amount: 8 Tabs. 1-2 Tab * oxyCODONE-acetaminophen 5-325 mg per tablet Commonly known as:  PERCOCET Your last dose was: Your next dose is: Take 2 Tabs by mouth every four (4) hours as needed for Pain. Max Daily Amount: 12 Tabs. 2 Tab VALTREX PO Your last dose was: Your next dose is: Take  by mouth as needed. * Notice: This list has 4 medication(s) that are the same as other medications prescribed for you. Read the directions carefully, and ask your doctor or other care provider to review them with you. Where to Get Your Medications Information on where to get these meds will be given to you by the nurse or doctor. ! Ask your nurse or doctor about these medications  
  clindamycin 300 mg capsule HYDROcodone-acetaminophen 5-325 mg per tablet  
 ondansetron 8 mg disintegrating tablet  
 oxyCODONE-acetaminophen 5-325 mg per tablet Discharge Instructions Nasal Septum Repair: What to Expect at Home Your Recovery You may have some swelling of your nose, upper lip, cheeks, or around your eyes after nasal surgery. You may have some bruises around your nose and eyes. Your nose may be sore and will bleed. This may last for several days after surgery. The tip of your nose and your upper lip and gums may be numb. Feeling will return in a few weeks to a few months.  Your sense of smell may not be as good after surgery. But it will improve and will often return to normal in 1 to 2 months. You will have a drip pad under your nose to collect mucus and blood. Change it only when it bleeds through. You may have to do this every hour for 24 hours after surgery. You will probably be able to return to work or school in a few days and to your normal routine in about 3 weeks. But this varies with your job and how much surgery you had. Most people recover fully in 1 to 2 months. You will have to visit your doctor during the 3 to 4 months after your surgery. Your doctor will check to see that your nose is healing well. This care sheet gives you a general idea about how long it will take for you to recover. But each person recovers at a different pace. Follow the steps below to get better as quickly as possible. How can you care for yourself at home? Activity ? · Rest when you feel tired. Getting enough sleep will help you recover. Do not lie flat. Raise your head with two or three pillows. This can reduce swelling. Try to sleep on your back for the month after surgery. You can also sleep in a reclining chair. ? · Try to walk each day. Start by walking a little more than you did the day before. Bit by bit, increase the amount you walk. Walking boosts blood flow and helps prevent pneumonia and constipation. Also, try to sit and stand as much as you can.  
? · For 1 week, try not to bend over or lift anything heavier than 10 pounds. This may include a child, heavy grocery bags and milk containers, a heavy briefcase or backpack, cat litter or dog food bags, or a vacuum . ? · You can take a shower or bath. Avoid swimming for 6 weeks. ? · Avoid strenuous activities, such as bicycle riding, jogging, weight lifting, or aerobic exercise, for 1 week or until your doctor says it is okay. ? · You may drive when you are no longer taking prescription pain pills and feel up to it. Diet ? · You can eat your normal diet. If your stomach is upset, try bland, low-fat foods like plain rice, broiled chicken, toast, and yogurt. ? · You may notice that your bowel movements are not regular right after your surgery. This is common. Try to avoid constipation and straining with bowel movements. You may want to take a fiber supplement every day. If you have not had a bowel movement after a couple of days, ask your doctor about taking a mild laxative. Medicines ? · Your doctor will tell you if and when you can restart your medicines. He or she will also give you instructions about taking any new medicines. ? · If you take blood thinners, such as warfarin (Coumadin), clopidogrel (Plavix), or aspirin, be sure to talk to your doctor. He or she will tell you if and when to start taking those medicines again. Make sure that you understand exactly what your doctor wants you to do. ? · Do not take aspirin, aspirin-containing medicines, or anti-inflammatory medicines such as ibuprofen (Advil, Motrin) or naproxen (Aleve) for 3 weeks following surgery unless your doctor says it is okay. ? · Take pain medicines exactly as directed. ¨ If the doctor gave you a prescription medicine for pain, take it as prescribed. ¨ Do not take two or more pain medicines at the same time unless the doctor told you to. Many pain medicines have acetaminophen, which is Tylenol. Too much acetaminophen (Tylenol) can be harmful. ? · If your doctor prescribed antibiotics, take them as directed. Do not stop taking them just because you feel better. You need to take the full course of antibiotics. ? · If you think your pain medicine is making you sick to your stomach: 
¨ Take your medicine after meals (unless your doctor has told you not to). ¨ Ask your doctor for a different pain medicine. Incision care ? · You will have a drip pad under your nose to collect blood.  Change it only when it has bled through. You may have to do this every hour for 24 hours after surgery. When bleeding stops, you can remove it. ? · If you have packing in your nose, leave it in. Your doctor will take it out. Ice and elevation ? · To help with swelling and pain, put ice or a cold pack on your nose for 10 to 20 minutes at a time. Put a thin cloth between the ice and your skin. ? · Sleep with your head raised up. You can also sleep in a reclining chair. Other instructions ? · Do not blow your nose for 1 week after surgery. ? · Do not put anything into your nose. ? · If you must sneeze, open your mouth and sneeze naturally. ? · After any packing is removed, use saline (saltwater) nasal washes to help keep your nasal passages open and wash out mucus and bacteria. You can buy saline nose drops at a grocery store or drugstore. Or you can make your own at home by adding 1 teaspoon of salt and 1 teaspoon of baking soda to 2 cups of distilled water. If you make your own, fill a bulb syringe with the solution, insert the tip into your nostril, and squeeze gently. Melvia Creamer your nose. ? · You can wear your glasses when you wish. Do not wear contacts until the day after the surgery. Follow-up care is a key part of your treatment and safety. Be sure to make and go to all appointments, and call your doctor if you are having problems. It's also a good idea to know your test results and keep a list of the medicines you take. When should you call for help? Call 911 anytime you think you may need emergency care. For example, call if: 
? · You passed out (lost consciousness). ? · You have severe trouble breathing. ?Call your doctor now or seek immediate medical care if: 
? · You have bleeding through the nasal packing that is not slowing. ? · You have symptoms of infection, such as: 
¨ Increased pain, swelling, warmth, or redness. ¨ Red streaks coming from the area. ¨ Pus draining from the area. ¨ A fever. ? · You have pain that does not get better after you take pain pills. ? Watch closely for any changes in your health, and be sure to contact your doctor if: 
? · You do not get better as expected. Where can you learn more? Go to http://janet-shay.info/. Enter Z178 in the search box to learn more about \"Nasal Septum Repair: What to Expect at Home. \" Current as of: May 12, 2017 Content Version: 11.4 © 9017-1393 [employer]. Care instructions adapted under license by [employer] (which disclaims liability or warranty for this information). If you have questions about a medical condition or this instruction, always ask your healthcare professional. [address] any warranty or liability for your use of this information. Rhinoplasty: What to Expect at [facility] Your Recovery Rhinoplasty is surgery to reshape your nose. It can be done to improve your appearance, fix a birth defect, or help you breathe better. You may have stitches or staples in your cuts (incisions). Stitches inside your nose and mouth will usually dissolve on their own. If you have staples, your doctor will take these out in the first week. A bandage will cover your nose. You may have a plastic or plaster splint to protect and help keep the new shape of your nose. You may have a \"nasal drip pad\" under your nostrils to collect any blood that may drip from your nose. Your doctor will show you how to change the pad as needed. You may have packing material inside your nose to reduce bleeding and swelling. Packing and the nasal drip pad will be removed within 2 days after surgery. The splint will be removed in about a week. Your nose will be bruised and swollen, and you may get dark bruises around your eyes. The swelling may get worse before it gets better. Most of the swelling should go away in 3 to 4 weeks.  You will have some pain in your nose, and you may have a headache. Your nose may be stuffy and you may have trouble breathing for a short time. The skin on the tip of your nose may be numb. You may have some itching or shooting pain as the feeling returns. If bones were broken during your surgery, you will need to avoid injury to your nose for about 3 months. In 3 to 4 weeks, you should have a good idea as to what your nose will look like. It can take up to a year to see the final result. This care sheet gives you a general idea about how long it will take for you to recover. But each person recovers at a different pace. Follow the steps below to feel better as quickly as possible. How can you care for yourself at home? Activity ? · Rest when you feel tired. Getting enough sleep will help you recover. ? · Keep your head raised for several days after surgery. Sleep with your head up by using 2 or 3 pillows. ? · Try to walk each day. Start by walking a little more than you did the day before. Bit by bit, increase the amount you walk. ? · You will probably need to take about 1 week off from work. It depends on the type of work you do and how you feel. ? · Avoid strenuous activities, such as bicycle riding, jogging, weight lifting, or aerobic exercise, for 2 to 3 weeks or until your doctor says it is okay. ? · Ask your doctor when you can drive again. ? · Do not blow your nose for at least 1 week after surgery. Wipe your nose gently with a tissue. If you need to sneeze, do it with your mouth open. ? · Ask your doctor when it'll be okay for you to bend over. ? · Do not rub your nose for 8 weeks. Use sunblock on your nose and wear a hat with a brim to avoid getting a sunburn. Put on sunblock or makeup gently. ? · Do not swim for a week. Diet ? · You may notice that your bowel movements are not regular right after your surgery. This is common.  Try to avoid constipation and straining with bowel movements. You may want to take a fiber supplement every day. If you have not had a bowel movement after a couple of days, ask your doctor about taking a mild laxative. Medicines ? · Your doctor will tell you if and when you can restart your medicines. He or she will also give you instructions about taking any new medicines. ? · If you take blood thinners, such as warfarin (Coumadin), clopidogrel (Plavix), or aspirin, be sure to talk to your doctor. He or she will tell you if and when to start taking those medicines again. Make sure that you understand exactly what your doctor wants you to do. ? · Be safe with medicines. Take pain medicines exactly as directed. ¨ If the doctor gave you a prescription medicine for pain, take it as prescribed. ¨ If you are not taking a prescription pain medicine, ask your doctor if you can take an over-the-counter medicine. ? · If your doctor prescribed antibiotics, take them as directed. Do not stop taking them just because you feel better. You need to take the full course of antibiotics. ? · If you think your pain medicine is making you sick to your stomach: 
¨ Take your medicine after meals (unless your doctor has told you not to). ¨ Ask your doctor for a different pain medicine. Incision care ? · After the stitches or staples are out, you may wash the incision with soap and water and gently dry the area. ? · If you have strips of tape on the incision the doctor made, leave the tape on for a week or until it falls off. Or follow your doctor's instructions for removing the tape. Other instructions ? · Put ice or a cold pack on your nose for 10 to 20 minutes at a time. Try to do this every 1 to 2 hours for the next 3 days (when you are awake) or until the swelling goes down. A bag of frozen peas or corn works well for this, because it molds to the shape of your face. Put a thin cloth between the ice and your skin. ? · Do not set glasses on your nose for 4 weeks. Instead, wrap a piece of tape around the bridge of the glasses and attach the tape to your forehead. ? · For 1 week, avoid wearing clothes that you pull over your head. Follow-up care is a key part of your treatment. Be sure to make and go to all appointments, and call your doctor if you are having problems. It's also a good idea to know your test results and keep a list of the medicines you take. When should you call for help? Call 911 anytime you think you may need emergency care. For example, call if: 
? · You passed out (lost consciousness). ? · You have severe trouble breathing. ? · You have sudden chest pain and shortness of breath, or you cough up blood. ?Call your doctor now or seek immediate medical care if: 
? · You have pain that does not get better after you take pain medicine. ? · You have symptoms of a blood clot in your leg (called a deep vein thrombosis), such as: 
¨ Pain in the calf, back of the knee, thigh, or groin. ¨ Redness and swelling in your leg or groin. ? · You are bleeding from the incision. ? · You have loose stitches, or your incision comes open. ? · You have symptoms of infection, such as: 
¨ Increased pain, swelling, warmth, or redness. ¨ Red streaks leading from the incision. ¨ Pus draining from the incision. ¨ A fever. ? · You are sick to your stomach or cannot keep fluids down. ? Watch closely for any changes in your health, and be sure to contact your doctor if: 
? · You do not get better as expected. Where can you learn more? Go to http://janet-shay.info/. Enter V976 in the search box to learn more about \"Rhinoplasty: What to Expect at Home. \" Current as of: October 13, 2016 Content Version: 11.4 © 1818-3555 [employer].  Care instructions adapted under license by [employer] (which disclaims liability or warranty for this information). If you have questions about a medical condition or this instruction, always ask your healthcare professional. [address] any warranty or liability for your use of this information. DISCHARGE SUMMARY from Nurse 1) 1000 mg of Tylenol was given thru the IV at 1:30 pm. No Tylenol products until after 7:30 pm. Your pain medication contains Tylenol. Do not exceed 4000 mg of Acetaminophen / Tylenol in a 24 hour period. 2) Scopolamine patch can stay on for a maximum of 72 hours. A common side effect is dryness of the mouth and light headiness. Try to keep the patch on for at least 24 hours to reduce the side effects of nausea from your anesthesia. When removing the patch, wipe behind your ear where the patch was to remove any excess medication. Please make sure not to touch your eyes after handling the patch as it may dilate your pupils. Immediately wash your hands after handling the patch. 3) 900 mg of Cleocin which is an antibiotic was given thru your IV. So start your antibiotic tomorrow as you are covered for today. PATIENT INSTRUCTIONS: 
 
 
F-face looks uneven A-arms unable to move or move unevenly S-speech slurred or non-existent T-time-call 911 as soon as signs and symptoms begin-DO NOT go Back to bed or wait to see if you get better-TIME IS BRAIN. Warning Signs of HEART ATTACK Call 911 if you have these symptoms: 
? Chest discomfort. Most heart attacks involve discomfort in the center of the chest that lasts more than a few minutes, or that goes away and comes back. It can feel like uncomfortable pressure, squeezing, fullness, or pain. ? Discomfort in other areas of the upper body. Symptoms can include pain or discomfort in one or both arms, the back, neck, jaw, or stomach. ? Shortness of breath with or without chest discomfort. ? Other signs may include breaking out in a cold sweat, nausea, or lightheadedness. Don't wait more than five minutes to call [address]! Fast action can save your life. Calling 911 is almost always the fastest way to get lifesaving treatment. Emergency Medical Services staff can begin treatment when they arrive  up to an hour sooner than if someone gets to the hospital by car. The discharge information has been reviewed with the patient and caregiver. The patient and caregiver verbalized understanding. Discharge medications reviewed with the patient and caregiver and appropriate educational materials and side effects teaching were provided. ___________________________________________________________________________________________________________________________________ Introducing [address]! [facility] introduces Postmaster patient portal. Now you can access parts of your medical record, email your doctor's office, and request medication refills online. 1. In your internet browser, go to [email or web address] 2. Click on the First Time User? Click Here link in the Sign In box. You will see the New Member Sign Up page. 3. Enter your Postmaster Access Code exactly as it appears below. You will not need to use this code after youve completed the sign-up process. If you do not sign up before the expiration date, you must request a new code. · Postmaster Access Code: [email or web address] Expires: 3/11/2018  5:46 PM 
 
4. Enter the last four digits of your Social Security Number (xxxx) and Date of Birth (mm/dd/yyyy) as indicated and click Submit. You will be taken to the next sign-up page. 5. Create a MyChart ID.  This will be your Postmaster login ID and cannot be changed, so think of one that is secure and easy to remember. 6. Create a [employer] password. You can change your password at any time. 7. Enter your Password Reset Question and Answer. This can be used at a later time if you forget your password. 8. Enter your e-mail address. You will receive e-mail notification when new information is available in [address] 9. Click Sign Up. You can now view and download portions of your medical record. 10. Click the Download Summary menu link to download a portable copy of your medical information. If you have questions, please visit the Frequently Asked Questions section of the [employer] website. Remember, [employer] is NOT to be used for urgent needs. For medical emergencies, dial 911. Now available from your iPhone and Android! Unresulted Labs-Please follow up with your PCP about these lab tests Order Current Status CULTURE, MRSA In process Providers Seen During Your Hospitalization Provider Specialty Primary office phone Guanakito Rodriguez MD Otolaryngology [phone] Your Primary Care Physician (PCP) Primary Care Physician Office Phone Office Fax Leon Patino [phone] You are allergic to the following Allergen Reactions Penicillins Other (comments) SWELLING THROAT Recent Documentation Height Weight BMI OB Status Smoking Status 1.6 m 54 kg 21.08 kg/m2 Chemically Induced Never Smoker Emergency Contacts Name Discharge Info Relation Home Work Mobile Leia Crook DISCHARGE CAREGIVER [3] Mother [14] [phone] Patient Belongings The following personal items are in your possession at time of discharge: 
  Dental Appliances: None  Visual Aid: None             Clothing:  (Clothes in locker) Please provide this summary of care documentation to your next provider. Signatures-by signing, you are acknowledging that this After Visit Summary has been reviewed with you and you have received a copy. Patient Signature:  ____________________________________________________________ Date:  ____________________________________________________________  
  
[facility] Provider Signature:  ____________________________________________________________ Date:  ____________________________________________________________

## 2017-12-12 LAB
BACTERIA SPEC CULT: NORMAL
BACTERIA SPEC CULT: NORMAL
SERVICE CMNT-IMP: NORMAL

## 2017-12-12 NOTE — ANESTHESIA POSTPROCEDURE EVALUATION
Post-Anesthesia Evaluation and Assessment    Patient: Nalini Gallegos MRN: [id number]  SSN: [id number]    YOB: 1970  Age: 41 y.o. Sex: female       Cardiovascular Function/Vital Signs  Visit Vitals    /67    Pulse 76    Temp 36.9 °C (98.4 °F)    Resp 19    Ht 5' 3\" (1.6 m)    Wt 54 kg (119 lb)    SpO2 96%    BMI 21.08 kg/m2       Patient is status post general anesthesia for Procedure(s):  MAJOR REVISION OF RHINOPLASTY, GRAFT RIB CARTILAGE TO NOSE, REPAIR NASAL VESTIBULAR STENOSIS. Nausea/Vomiting: None    Postoperative hydration reviewed and adequate. Pain:  Pain Scale 1: Numeric (0 - 10) (12/11/17 1830)  Pain Intensity 1: 0 (12/11/17 1830)   Managed    Neurological Status:   Neuro (WDL): Within Defined Limits (12/11/17 1830)  Neuro  Neurologic State: Alert (12/11/17 1830)   At baseline    Mental Status and Level of Consciousness: Arousable    Pulmonary Status:   O2 Device: Room air (12/11/17 1830)   Adequate oxygenation and airway patent    Complications related to anesthesia: None    Post-anesthesia assessment completed.  No concerns    Signed By: Shiv Thomason MD     December 12, 2017

## 2017-12-18 NOTE — OP NOTES
OPERATIVE REPORT    NAME: Ksenia Navarro  MRN: [id number]    PREOPERATIVE DIAGNOSES  1. Septal deviation. 2. Nasal septal deformity. 3. Compensatory turbinate hypertrophy. POSTOPERATIVE DIAGNOSES  1. Septal deviation. 2. Nasal septal deformity. 3. Compensatory turbinate hypertrophy. OPERATIVE PROCEDURE:  1. Septorhinoplasty with vestibular stenosis repair  2. Bilateral inferior turbinate reduction. SURGEON: Brandon Carreon MD    ANESTHESIA: General endotracheal anesthesia. COMPLICATIONS: None. INDICATIONS AND FINDINGS: The patient presented with nasal obstruction and  sinus complaints, identifying problem is that of fixed nasal obstruction  from septal deviation and turbinate hypertrophy with component of dynamic  collapse from sidewall obstruction, where upper lateral cartilage is  dislocated from the caudal nasal bones, problem not helped with allergy  management, as expected for mechanical obstruction, and hence indications  for nasal airway surgery, selecting septoplasty to address buckled nasal  septum which obstructed approximately 85% of the cross section area and  turbinate hypertrophy past the nasal valve and reconstruct the supporting  structure of the nose with a septal columella strut and reconstruction of  the internal valves with batten type grafting and hence indications. DESCRIPTION OF PROCEDURE: In the operating room the patient was induced  under general endotracheal anesthesia following which he was prepped and  draped in the sterile fashion using 1% lidocaine with 1:100,000 parts  epinephrine for local infiltration and bilateral infra-orbital foramen  block. An open structure approach was made through and inverted v incision in the mid columella, extending to marginal incisions, elevating nasal envelope in sub nasal SMAS plane and elevating bilateral  submucoperichondrial flaps to expose the cartilaginous and bony septum.  The  bony cartilaginous junction was incised, incising the deflected  perpendicular plate high and low with foam and scissors,removing the  deflected bone with duck bill forceps. The subluxed portion of septum was  resected with the Hattie Miller off the crest using a T-chisel to remove the bony  spurs once the leaflets were elevated into the floor of the nose. There was  such a marked buccal deformity, early obstruction probably being greater  than 90% of the cross sectioned area, that the L-strut required resecting  it at is caudal element, requiring a nasal septal reconstruction, not just  a septoplasty for functional purpose. The remaining L-strut mainly over the  dorsum was scored and morselized minimally to break the membrane of this  curved structure, allowing a septocolumellar graft to brace it in the  midline and restructure this tent pole type anatomic structure. A 2 x 2 cm  plate of straight septal cartilage from the posterior inferior aspect of  the septum was then beveled and shaped. A pocket was created between the  medial crura anteriorly with a retrograde dissection. Septocolumella  reconstruction was next planned, placing a 4-0 gut suture around the  septocolumellar graft and advancing this into place, apposing it against  the remaining L-strut septal structure, held in place with percutaneous 27  gauge needles and pulled anteriorly with the percutaneous gut suture pulled  through the columella, through the medial crura. Mattress sutures of 5-0  PDS were placed to fix this into position, structurally rigid to resupport  the nose. Homograft costal cartilage was used, removing the cortex of cartilage and resting to make sure no warping occurred with time. Carved for appropriate structural use through the case. Tooth-pick grafts of septal cartilage were placed in pockets created along the ala to stabilize the nasal side walls. A figure-of-eight suture was then used to center this on the  maxillary crest periosteum.  Remaining straight plates of cartilage and bone  were then positioned in their anatomic place using 3-0 chromic gut suture  to close the incision and mattress the dead space of the leaflet closed. Bilateral intercartilaginous incisions were made elevating the nasal end  within the subnasal SMAS plane to expose the upper lateral cartilages where  they were dislocated from the caudal nasal bones. Redundant plates of  septal cartilage were then morselized to create wafers of cartilage which  were then placed in a batten type overlay fashion over the upper lateral  cartilages. These were mattressed to the upper lateral cartilages, closing  the intercartilaginous incisions with 5-0 plain gut suture. Stab incisions were made at the base of each inferior turbinate and the  microdebrider turbinate blade was used to core out the parenchyma of each  inferior turbinate, preserving mucosa maximally but resecting portions of  turbinate bone at its medial surface. The bone was further addressed,  outfracturing each inferior turbinate to open up the nasal valve at this  level. Bilateral Figueroa splints were applied using a silk suture to fix them  to the membranous septum. The throat pack was removed and the patient then  handed over to anesthesia for awakening and transfer to the recovery room. ESTIMATED BLOOD LOSS: 30 mL.     SPECIMEN:    No complications        J Carlos Balderas MD

## 2018-10-17 ENCOUNTER — HOSPITAL ENCOUNTER (OUTPATIENT)
Dept: GENERAL RADIOLOGY | Age: 48
Discharge: HOME | End: 2018-10-17
Payer: COMMERCIAL

## 2018-10-17 DIAGNOSIS — R05.9 COUGH: ICD-10-CM

## 2018-10-17 PROCEDURE — 71046 X-RAY EXAM CHEST 2 VIEWS: CPT

## 2019-01-22 RX ORDER — MINERAL OIL
180 ENEMA (ML) RECTAL DAILY
COMMUNITY

## 2019-01-22 RX ORDER — FLUTICASONE PROPIONATE 50 MCG
1 SPRAY, SUSPENSION (ML) NASAL DAILY
COMMUNITY

## 2019-01-22 RX ORDER — IBUPROFEN 200 MG
400 TABLET ORAL
COMMUNITY

## 2019-01-28 ENCOUNTER — ANESTHESIA (OUTPATIENT)
Dept: SURGERY | Age: 49
End: 2019-01-28
Payer: COMMERCIAL

## 2019-01-28 ENCOUNTER — HOSPITAL ENCOUNTER (OUTPATIENT)
Age: 49
Setting detail: OUTPATIENT SURGERY
Discharge: HOME | End: 2019-01-28
Attending: OTOLARYNGOLOGY | Admitting: OTOLARYNGOLOGY
Payer: COMMERCIAL

## 2019-01-28 ENCOUNTER — ANESTHESIA EVENT (OUTPATIENT)
Dept: SURGERY | Age: 49
End: 2019-01-28
Payer: COMMERCIAL

## 2019-01-28 VITALS
TEMPERATURE: 97.4 F | HEART RATE: 70 BPM | BODY MASS INDEX: 22.15 KG/M2 | HEIGHT: 63 IN | RESPIRATION RATE: 11 BRPM | OXYGEN SATURATION: 96 % | DIASTOLIC BLOOD PRESSURE: 64 MMHG | WEIGHT: 125 LBS | SYSTOLIC BLOOD PRESSURE: 102 MMHG

## 2019-01-28 DIAGNOSIS — J34.2 NASAL SEPTAL DEVIATION: Primary | ICD-10-CM

## 2019-01-28 LAB — HCG UR QL: NEGATIVE

## 2019-01-28 PROCEDURE — 74011000250 HC RX REV CODE- 250: Performed by: OTOLARYNGOLOGY

## 2019-01-28 PROCEDURE — 77030018846 HC SOL IRR STRL H20 ICUM -A: Performed by: OTOLARYNGOLOGY

## 2019-01-28 PROCEDURE — 77030018836 HC SOL IRR NACL ICUM -A: Performed by: OTOLARYNGOLOGY

## 2019-01-28 PROCEDURE — 77030020782 HC GWN BAIR PAWS FLX 3M -B

## 2019-01-28 PROCEDURE — 77030026438 HC STYL ET INTUB CARD -A: Performed by: ANESTHESIOLOGY

## 2019-01-28 PROCEDURE — 81025 URINE PREGNANCY TEST: CPT

## 2019-01-28 PROCEDURE — 77030008684 HC TU ET CUF COVD -B: Performed by: ANESTHESIOLOGY

## 2019-01-28 PROCEDURE — 74011250636 HC RX REV CODE- 250/636: Performed by: OTOLARYNGOLOGY

## 2019-01-28 PROCEDURE — 74011250636 HC RX REV CODE- 250/636: Performed by: ANESTHESIOLOGY

## 2019-01-28 PROCEDURE — 74011250636 HC RX REV CODE- 250/636

## 2019-01-28 PROCEDURE — 74011250637 HC RX REV CODE- 250/637

## 2019-01-28 PROCEDURE — 76010000149 HC OR TIME 1 TO 1.5 HR: Performed by: OTOLARYNGOLOGY

## 2019-01-28 PROCEDURE — 74011250637 HC RX REV CODE- 250/637: Performed by: OTOLARYNGOLOGY

## 2019-01-28 PROCEDURE — 77030032490 HC SLV COMPR SCD KNE COVD -B: Performed by: OTOLARYNGOLOGY

## 2019-01-28 PROCEDURE — 76060000034 HC ANESTHESIA 1.5 TO 2 HR: Performed by: OTOLARYNGOLOGY

## 2019-01-28 PROCEDURE — 76210000021 HC REC RM PH II 0.5 TO 1 HR: Performed by: OTOLARYNGOLOGY

## 2019-01-28 PROCEDURE — 76210000016 HC OR PH I REC 1 TO 1.5 HR: Performed by: OTOLARYNGOLOGY

## 2019-01-28 PROCEDURE — 77030028681 HC DRSG NSL ABSRB NASOPORE STRY -C: Performed by: OTOLARYNGOLOGY

## 2019-01-28 PROCEDURE — 77030006907 HC BLD SNUS SHV MEDT -C: Performed by: OTOLARYNGOLOGY

## 2019-01-28 PROCEDURE — 74011000250 HC RX REV CODE- 250

## 2019-01-28 PROCEDURE — 77030002888 HC SUT CHRMC J&J -A: Performed by: OTOLARYNGOLOGY

## 2019-01-28 RX ORDER — SCOLOPAMINE TRANSDERMAL SYSTEM 1 MG/1
PATCH, EXTENDED RELEASE TRANSDERMAL AS NEEDED
Status: DISCONTINUED | OUTPATIENT
Start: 2019-01-28 | End: 2019-01-28 | Stop reason: HOSPADM

## 2019-01-28 RX ORDER — ESMOLOL HYDROCHLORIDE 10 MG/ML
INJECTION INTRAVENOUS AS NEEDED
Status: DISCONTINUED | OUTPATIENT
Start: 2019-01-28 | End: 2019-01-28 | Stop reason: HOSPADM

## 2019-01-28 RX ORDER — LIDOCAINE HYDROCHLORIDE 10 MG/ML
0.1 INJECTION, SOLUTION EPIDURAL; INFILTRATION; INTRACAUDAL; PERINEURAL AS NEEDED
Status: DISCONTINUED | OUTPATIENT
Start: 2019-01-28 | End: 2019-01-28 | Stop reason: HOSPADM

## 2019-01-28 RX ORDER — ONDANSETRON 2 MG/ML
4 INJECTION INTRAMUSCULAR; INTRAVENOUS AS NEEDED
Status: DISCONTINUED | OUTPATIENT
Start: 2019-01-28 | End: 2019-01-28 | Stop reason: HOSPADM

## 2019-01-28 RX ORDER — OXYCODONE AND ACETAMINOPHEN 5; 325 MG/1; MG/1
2 TABLET ORAL
Qty: 30 TAB | Refills: 0 | Status: SHIPPED | OUTPATIENT
Start: 2019-01-28

## 2019-01-28 RX ORDER — MIDAZOLAM HYDROCHLORIDE 1 MG/ML
1 INJECTION, SOLUTION INTRAMUSCULAR; INTRAVENOUS AS NEEDED
Status: DISCONTINUED | OUTPATIENT
Start: 2019-01-28 | End: 2019-01-28 | Stop reason: HOSPADM

## 2019-01-28 RX ORDER — MIDAZOLAM HYDROCHLORIDE 1 MG/ML
0.5 INJECTION, SOLUTION INTRAMUSCULAR; INTRAVENOUS
Status: DISCONTINUED | OUTPATIENT
Start: 2019-01-28 | End: 2019-01-28 | Stop reason: HOSPADM

## 2019-01-28 RX ORDER — SODIUM CHLORIDE, SODIUM LACTATE, POTASSIUM CHLORIDE, CALCIUM CHLORIDE 600; 310; 30; 20 MG/100ML; MG/100ML; MG/100ML; MG/100ML
INJECTION, SOLUTION INTRAVENOUS
Status: DISCONTINUED | OUTPATIENT
Start: 2019-01-28 | End: 2019-01-28 | Stop reason: HOSPADM

## 2019-01-28 RX ORDER — BACITRACIN 500 [USP'U]/G
OINTMENT TOPICAL AS NEEDED
Status: DISCONTINUED | OUTPATIENT
Start: 2019-01-28 | End: 2019-01-28 | Stop reason: HOSPADM

## 2019-01-28 RX ORDER — FENTANYL CITRATE 50 UG/ML
INJECTION, SOLUTION INTRAMUSCULAR; INTRAVENOUS AS NEEDED
Status: DISCONTINUED | OUTPATIENT
Start: 2019-01-28 | End: 2019-01-28 | Stop reason: HOSPADM

## 2019-01-28 RX ORDER — SODIUM CHLORIDE 0.9 % (FLUSH) 0.9 %
5-40 SYRINGE (ML) INJECTION AS NEEDED
Status: DISCONTINUED | OUTPATIENT
Start: 2019-01-28 | End: 2019-01-28 | Stop reason: HOSPADM

## 2019-01-28 RX ORDER — FENTANYL CITRATE 50 UG/ML
50 INJECTION, SOLUTION INTRAMUSCULAR; INTRAVENOUS AS NEEDED
Status: DISCONTINUED | OUTPATIENT
Start: 2019-01-28 | End: 2019-01-28 | Stop reason: HOSPADM

## 2019-01-28 RX ORDER — SODIUM CHLORIDE 0.9 % (FLUSH) 0.9 %
5-40 SYRINGE (ML) INJECTION EVERY 8 HOURS
Status: DISCONTINUED | OUTPATIENT
Start: 2019-01-28 | End: 2019-01-28 | Stop reason: HOSPADM

## 2019-01-28 RX ORDER — OXYMETAZOLINE HCL 0.05 %
2 SPRAY, NON-AEROSOL (ML) NASAL
Status: DISCONTINUED | OUTPATIENT
Start: 2019-01-28 | End: 2019-01-28 | Stop reason: HOSPADM

## 2019-01-28 RX ORDER — PROPOFOL 10 MG/ML
INJECTION, EMULSION INTRAVENOUS
Status: DISCONTINUED | OUTPATIENT
Start: 2019-01-28 | End: 2019-01-28 | Stop reason: HOSPADM

## 2019-01-28 RX ORDER — DEXMEDETOMIDINE HYDROCHLORIDE 4 UG/ML
INJECTION, SOLUTION INTRAVENOUS AS NEEDED
Status: DISCONTINUED | OUTPATIENT
Start: 2019-01-28 | End: 2019-01-28 | Stop reason: HOSPADM

## 2019-01-28 RX ORDER — SODIUM CHLORIDE 9 MG/ML
25 INJECTION, SOLUTION INTRAVENOUS CONTINUOUS
Status: DISCONTINUED | OUTPATIENT
Start: 2019-01-28 | End: 2019-01-28 | Stop reason: HOSPADM

## 2019-01-28 RX ORDER — FENTANYL CITRATE 50 UG/ML
25 INJECTION, SOLUTION INTRAMUSCULAR; INTRAVENOUS
Status: DISCONTINUED | OUTPATIENT
Start: 2019-01-28 | End: 2019-01-28 | Stop reason: HOSPADM

## 2019-01-28 RX ORDER — CLINDAMYCIN PHOSPHATE 600 MG/50ML
600 INJECTION INTRAVENOUS EVERY 6 HOURS
Status: DISCONTINUED | OUTPATIENT
Start: 2019-01-28 | End: 2019-01-28 | Stop reason: HOSPADM

## 2019-01-28 RX ORDER — LIDOCAINE HYDROCHLORIDE AND EPINEPHRINE 10; 10 MG/ML; UG/ML
INJECTION, SOLUTION INFILTRATION; PERINEURAL AS NEEDED
Status: DISCONTINUED | OUTPATIENT
Start: 2019-01-28 | End: 2019-01-28 | Stop reason: HOSPADM

## 2019-01-28 RX ORDER — DEXTROSE, SODIUM CHLORIDE, SODIUM LACTATE, POTASSIUM CHLORIDE, AND CALCIUM CHLORIDE 5; .6; .31; .03; .02 G/100ML; G/100ML; G/100ML; G/100ML; G/100ML
100 INJECTION, SOLUTION INTRAVENOUS CONTINUOUS
Status: DISCONTINUED | OUTPATIENT
Start: 2019-01-28 | End: 2019-01-28 | Stop reason: HOSPADM

## 2019-01-28 RX ORDER — DEXAMETHASONE SODIUM PHOSPHATE 10 MG/ML
10 INJECTION INTRAMUSCULAR; INTRAVENOUS ONCE
Status: DISCONTINUED | OUTPATIENT
Start: 2019-01-28 | End: 2019-01-28 | Stop reason: HOSPADM

## 2019-01-28 RX ORDER — SUCCINYLCHOLINE CHLORIDE 20 MG/ML
INJECTION INTRAMUSCULAR; INTRAVENOUS AS NEEDED
Status: DISCONTINUED | OUTPATIENT
Start: 2019-01-28 | End: 2019-01-28 | Stop reason: HOSPADM

## 2019-01-28 RX ORDER — CLINDAMYCIN HYDROCHLORIDE 300 MG/1
300 CAPSULE ORAL 3 TIMES DAILY
Qty: 21 CAP | Refills: 0 | Status: SHIPPED | OUTPATIENT
Start: 2019-01-28 | End: 2019-02-04

## 2019-01-28 RX ORDER — SODIUM CHLORIDE, SODIUM LACTATE, POTASSIUM CHLORIDE, CALCIUM CHLORIDE 600; 310; 30; 20 MG/100ML; MG/100ML; MG/100ML; MG/100ML
75 INJECTION, SOLUTION INTRAVENOUS CONTINUOUS
Status: DISCONTINUED | OUTPATIENT
Start: 2019-01-28 | End: 2019-01-28 | Stop reason: HOSPADM

## 2019-01-28 RX ORDER — DEXAMETHASONE SODIUM PHOSPHATE 4 MG/ML
INJECTION, SOLUTION INTRA-ARTICULAR; INTRALESIONAL; INTRAMUSCULAR; INTRAVENOUS; SOFT TISSUE AS NEEDED
Status: DISCONTINUED | OUTPATIENT
Start: 2019-01-28 | End: 2019-01-28 | Stop reason: HOSPADM

## 2019-01-28 RX ORDER — MIDAZOLAM HYDROCHLORIDE 1 MG/ML
INJECTION, SOLUTION INTRAMUSCULAR; INTRAVENOUS AS NEEDED
Status: DISCONTINUED | OUTPATIENT
Start: 2019-01-28 | End: 2019-01-28 | Stop reason: HOSPADM

## 2019-01-28 RX ORDER — OXYMETAZOLINE HCL 0.05 %
SPRAY, NON-AEROSOL (ML) NASAL AS NEEDED
Status: DISCONTINUED | OUTPATIENT
Start: 2019-01-28 | End: 2019-01-28 | Stop reason: HOSPADM

## 2019-01-28 RX ORDER — ROCURONIUM BROMIDE 10 MG/ML
INJECTION, SOLUTION INTRAVENOUS AS NEEDED
Status: DISCONTINUED | OUTPATIENT
Start: 2019-01-28 | End: 2019-01-28 | Stop reason: HOSPADM

## 2019-01-28 RX ORDER — MORPHINE SULFATE 10 MG/ML
2 INJECTION, SOLUTION INTRAMUSCULAR; INTRAVENOUS
Status: DISCONTINUED | OUTPATIENT
Start: 2019-01-28 | End: 2019-01-28 | Stop reason: HOSPADM

## 2019-01-28 RX ORDER — GLYCOPYRROLATE 0.2 MG/ML
INJECTION INTRAMUSCULAR; INTRAVENOUS AS NEEDED
Status: DISCONTINUED | OUTPATIENT
Start: 2019-01-28 | End: 2019-01-28 | Stop reason: HOSPADM

## 2019-01-28 RX ORDER — PROPOFOL 10 MG/ML
INJECTION, EMULSION INTRAVENOUS AS NEEDED
Status: DISCONTINUED | OUTPATIENT
Start: 2019-01-28 | End: 2019-01-28 | Stop reason: HOSPADM

## 2019-01-28 RX ORDER — ONDANSETRON 2 MG/ML
INJECTION INTRAMUSCULAR; INTRAVENOUS AS NEEDED
Status: DISCONTINUED | OUTPATIENT
Start: 2019-01-28 | End: 2019-01-28 | Stop reason: HOSPADM

## 2019-01-28 RX ORDER — ACETAMINOPHEN 10 MG/ML
INJECTION, SOLUTION INTRAVENOUS AS NEEDED
Status: DISCONTINUED | OUTPATIENT
Start: 2019-01-28 | End: 2019-01-28 | Stop reason: HOSPADM

## 2019-01-28 RX ORDER — ROPIVACAINE HYDROCHLORIDE 5 MG/ML
30 INJECTION, SOLUTION EPIDURAL; INFILTRATION; PERINEURAL ONCE
Status: DISCONTINUED | OUTPATIENT
Start: 2019-01-28 | End: 2019-01-28 | Stop reason: HOSPADM

## 2019-01-28 RX ORDER — LIDOCAINE HYDROCHLORIDE 20 MG/ML
INJECTION, SOLUTION EPIDURAL; INFILTRATION; INTRACAUDAL; PERINEURAL AS NEEDED
Status: DISCONTINUED | OUTPATIENT
Start: 2019-01-28 | End: 2019-01-28 | Stop reason: HOSPADM

## 2019-01-28 RX ORDER — SODIUM CHLORIDE, SODIUM LACTATE, POTASSIUM CHLORIDE, CALCIUM CHLORIDE 600; 310; 30; 20 MG/100ML; MG/100ML; MG/100ML; MG/100ML
125 INJECTION, SOLUTION INTRAVENOUS CONTINUOUS
Status: DISCONTINUED | OUTPATIENT
Start: 2019-01-28 | End: 2019-01-28 | Stop reason: HOSPADM

## 2019-01-28 RX ORDER — HYDROMORPHONE HYDROCHLORIDE 2 MG/ML
INJECTION, SOLUTION INTRAMUSCULAR; INTRAVENOUS; SUBCUTANEOUS AS NEEDED
Status: DISCONTINUED | OUTPATIENT
Start: 2019-01-28 | End: 2019-01-28 | Stop reason: HOSPADM

## 2019-01-28 RX ADMIN — PROPOFOL 50 MG: 10 INJECTION, EMULSION INTRAVENOUS at 11:43

## 2019-01-28 RX ADMIN — DEXAMETHASONE SODIUM PHOSPHATE 10 MG: 4 INJECTION, SOLUTION INTRA-ARTICULAR; INTRALESIONAL; INTRAMUSCULAR; INTRAVENOUS; SOFT TISSUE at 11:03

## 2019-01-28 RX ADMIN — SUCCINYLCHOLINE CHLORIDE 120 MG: 20 INJECTION INTRAMUSCULAR; INTRAVENOUS at 10:52

## 2019-01-28 RX ADMIN — ONDANSETRON 4 MG: 2 INJECTION INTRAMUSCULAR; INTRAVENOUS at 12:23

## 2019-01-28 RX ADMIN — DEXMEDETOMIDINE HYDROCHLORIDE 4 MCG: 4 INJECTION, SOLUTION INTRAVENOUS at 11:16

## 2019-01-28 RX ADMIN — HYDROMORPHONE HYDROCHLORIDE 0.4 MG: 2 INJECTION, SOLUTION INTRAMUSCULAR; INTRAVENOUS; SUBCUTANEOUS at 12:13

## 2019-01-28 RX ADMIN — CLINDAMYCIN PHOSPHATE 600 MG: 600 INJECTION, SOLUTION INTRAVENOUS at 10:57

## 2019-01-28 RX ADMIN — ONDANSETRON 4 MG: 2 INJECTION INTRAMUSCULAR; INTRAVENOUS at 11:55

## 2019-01-28 RX ADMIN — DEXMEDETOMIDINE HYDROCHLORIDE 4 MCG: 4 INJECTION, SOLUTION INTRAVENOUS at 10:57

## 2019-01-28 RX ADMIN — DEXMEDETOMIDINE HYDROCHLORIDE 4 MCG: 4 INJECTION, SOLUTION INTRAVENOUS at 11:25

## 2019-01-28 RX ADMIN — ROCURONIUM BROMIDE 10 MG: 10 INJECTION, SOLUTION INTRAVENOUS at 10:52

## 2019-01-28 RX ADMIN — GLYCOPYRROLATE 0.2 MG: 0.2 INJECTION INTRAMUSCULAR; INTRAVENOUS at 11:14

## 2019-01-28 RX ADMIN — MIDAZOLAM HYDROCHLORIDE 2 MG: 1 INJECTION, SOLUTION INTRAMUSCULAR; INTRAVENOUS at 10:41

## 2019-01-28 RX ADMIN — DEXMEDETOMIDINE HYDROCHLORIDE 4 MCG: 4 INJECTION, SOLUTION INTRAVENOUS at 11:02

## 2019-01-28 RX ADMIN — SODIUM CHLORIDE, SODIUM LACTATE, POTASSIUM CHLORIDE, CALCIUM CHLORIDE: 600; 310; 30; 20 INJECTION, SOLUTION INTRAVENOUS at 10:20

## 2019-01-28 RX ADMIN — ACETAMINOPHEN 1000 MG: 10 INJECTION, SOLUTION INTRAVENOUS at 11:15

## 2019-01-28 RX ADMIN — FENTANYL CITRATE 50 MCG: 50 INJECTION, SOLUTION INTRAMUSCULAR; INTRAVENOUS at 10:52

## 2019-01-28 RX ADMIN — SCOLOPAMINE TRANSDERMAL SYSTEM 1 PATCH: 1 PATCH, EXTENDED RELEASE TRANSDERMAL at 10:41

## 2019-01-28 RX ADMIN — PROPOFOL 25 MG: 10 INJECTION, EMULSION INTRAVENOUS at 11:31

## 2019-01-28 RX ADMIN — PROPOFOL 125 MCG/KG/MIN: 10 INJECTION, EMULSION INTRAVENOUS at 10:54

## 2019-01-28 RX ADMIN — PROPOFOL 130 MG: 10 INJECTION, EMULSION INTRAVENOUS at 10:52

## 2019-01-28 RX ADMIN — FENTANYL CITRATE 50 MCG: 50 INJECTION, SOLUTION INTRAMUSCULAR; INTRAVENOUS at 11:07

## 2019-01-28 RX ADMIN — ESMOLOL HYDROCHLORIDE 20 MG: 10 INJECTION INTRAVENOUS at 11:16

## 2019-01-28 RX ADMIN — LIDOCAINE HYDROCHLORIDE 50 MG: 20 INJECTION, SOLUTION EPIDURAL; INFILTRATION; INTRACAUDAL; PERINEURAL at 10:52

## 2019-01-28 RX ADMIN — HYDROMORPHONE HYDROCHLORIDE 0.2 MG: 2 INJECTION, SOLUTION INTRAMUSCULAR; INTRAVENOUS; SUBCUTANEOUS at 12:17

## 2019-01-28 RX ADMIN — DEXMEDETOMIDINE HYDROCHLORIDE 4 MCG: 4 INJECTION, SOLUTION INTRAVENOUS at 11:07

## 2019-01-28 NOTE — H&P
History and Physical 
 
Subjective:  
 
Virgilio Hubbard is a 57 y.o.  female who presents with chronic recurrent sinus disease and nasal obstruction, not amenable to allergy nor medical management. CT showed accessory ostia Past Medical History:  
Diagnosis Date  Chronic pain LOWER BACK  Murmur, cardiac H/O MURMUR CHILDHOOD  Nausea & vomiting Past Surgical History:  
Procedure Laterality Date  HX HEENT  2016 Dr. Wilder Schreiber repaired \"perforation in cartiledge\"  HX HEENT  2018 RIB CARTILAGE, EAR GRAFT TO NASAL AREA  
 HX OTHER SURGICAL  1998 Bilateral Breast Augmentation [address] Rhinoplasty Family History Problem Relation Age of Onset 24 [facility] Arthritis-osteo Mother 24 [facility] Other Mother GERD, COLITIS  Cancer Father COLON  
 Hypertension Father  Heart Disease Father MI  
 Anesth Problems Neg Hx Social History Tobacco Use  Smoking status: Never Smoker  Smokeless tobacco: Never Used Substance Use Topics  Alcohol use: Yes Comment: 1/month Prior to Admission medications Medication Sig Start Date End Date Taking? Authorizing Provider  
fexofenadine (ALLEGRA) 180 mg tablet Take 180 mg by mouth daily. Yes Provider, Historical  
fluticasone (FLONASE ALLERGY RELIEF) 50 mcg/actuation nasal spray 1 [place] by Both Nostrils route daily. Yes Provider, Historical  
ibuprofen (ADVIL) 200 mg tablet Take 400 mg by mouth every six (6) hours as needed for Pain. Yes Provider, Historical  
VALACYCLOVIR HCL (VALTREX PO) Take  by mouth as needed. Yes Provider, Historical  
OTHER daily. BCP-    Yes Provider, Historical  
 
Allergies Allergen Reactions  Other Food Other (comments) RYE - FOOD ALLERGY  Black Pepper Other (comments)  Coconut Other (comments)  Corn Other (comments)  Penicillins Other (comments) SWELLING THROAT Review of Systems: A comprehensive review of systems was negative except for that written in the History of Present Illness. Objective: Intake and Output:   
No intake/output data recorded. No intake/output data recorded. Physical Exam:  
Visit Vitals /56 Pulse (!) 56 Temp 98.3 °F (36.8 °C) Resp 18 Ht 5' 3\" (1.6 m) Wt 56.7 kg (125 lb) LMP 12/22/2011 SpO2 100% BMI 22.14 kg/m² General:  Alert, cooperative, no distress, appears stated age. Head:  Normocephalic, without obvious abnormality, atraumatic. Eyes:  Conjunctivae/corneas clear. PERRL, EOMs intact. Fundi benign. Ears:  Normal TMs and external ear canals both ears. Nose: Septal deviation Turbinate hypertrophy Mucositis in middle meatus Throat: Lips, mucosa, and tongue normal. Teeth and gums normal.  
Neck: Supple, symmetrical, trachea midline, no adenopathy, thyroid: no enlargement/tenderness/nodules, no carotid bruit and no JVD. Back:   Symmetric, no curvature. ROM normal. No CVA tenderness. Lungs:   Clear to auscultation bilaterally. Chest wall:  No tenderness or deformity. Heart:  Regular rate and rhythm, S1, S2 normal, no murmur, click, rub or gallop. Extremities: Extremities normal, atraumatic, no cyanosis or edema. Pulses: 2+ and symmetric all extremities. Skin: Skin color, texture, turgor normal. No rashes or lesions. Lymph nodes: Cervical, supraclavicular, and axillary nodes normal.  
Neurologic: CNII-XII intact. Normal strength, sensation and reflexes throughout. Data Review:  
Recent Results (from the past 24 hour(s)) HCG URINE, QL. - POC Collection Time: 01/28/19  9:31 AM  
Result Value Ref Range Pregnancy test,urine (POC) NEGATIVE  NEG Assessment:  
 
Chronic recurrent sinus disease Septal deviation Turbinate hypertrophy Plan: FESS Septoplasty TUrbinate reduction Signed By: Kourtney Alvarez MD   
 January 28, 2019

## 2019-01-28 NOTE — ANESTHESIA POSTPROCEDURE EVALUATION
Post-Anesthesia Evaluation and Assessment Patient: Hiren Howard MRN: [id number]  SSN: [id number] YOB: 1970  Age: 57 y.o. Sex: female I have evaluated the patient and they are stable and ready for discharge from the PACU. Cardiovascular Function/Vital Signs Visit Vitals /61 Pulse 69 Temp 36.4 °C (97.6 °F) Resp 11 Ht 5' 3\" (1.6 m) Wt 56.7 kg (125 lb) SpO2 95% BMI 22.14 kg/m² Patient is status post General anesthesia for Procedure(s): BILATERAL ENDOSCOPIC SINUS SURGERY WITH PARTIAL ETHMOIDECTOMY AND TISSUE REMOVAL FROM MAXILLARY SINUS, SEPTOPLASTY. Nausea/Vomiting: None Postoperative hydration reviewed and adequate. Pain: 
Pain Scale 1: Numeric (0 - 10) (01/28/19 1218) Pain Intensity 1: 0 (01/28/19 1218) Managed Neurological Status:  
Neuro (WDL): Within Defined Limits (01/28/19 1218) Neuro LUE Motor Response: Purposeful (01/28/19 1218) LLE Motor Response: Purposeful (01/28/19 1218) RUE Motor Response: Purposeful (01/28/19 1218) RLE Motor Response: Purposeful (01/28/19 1218) At baseline Mental Status, Level of Consciousness: Alert and  oriented to person, place, and time Pulmonary Status:  
O2 Device: Room air (01/28/19 1218) Adequate oxygenation and airway patent Complications related to anesthesia: None Post-anesthesia assessment completed. No concerns Signed By: Sunil Cleaning MD   
 January 28, 2019 Procedure(s): BILATERAL ENDOSCOPIC SINUS SURGERY WITH PARTIAL ETHMOIDECTOMY AND TISSUE REMOVAL FROM MAXILLARY SINUS, SEPTOPLASTY. <BSHSIANPOST> Visit Vitals /61 Pulse 69 Temp 36.4 °C (97.6 °F) Resp 11 Ht 5' 3\" (1.6 m) Wt 56.7 kg (125 lb) SpO2 95% BMI 22.14 kg/m²

## 2019-01-28 NOTE — ROUTINE PROCESS
Patient: Nicolasa Grijalva MRN: [id number]  SSN: [id number] YOB: 1970  Age: 57 y.o. Sex: female Patient is status post Procedure(s): BILATERAL ENDOSCOPIC SINUS SURGERY WITH PARTIAL ETHMOIDECTOMY AND TISSUE REMOVAL FROM MAXILLARY SINUS, SEPTOPLASTY. Surgeon(s) and Role: Daphne Kumar MD - Primary Local/Dose/Irrigation:  See [facility] Peripheral IV 01/28/19 Left Antecubital (Active) Site Assessment Clean, dry, & intact 1/28/2019  9:56 AM  
Phlebitis Assessment 0 1/28/2019  9:56 AM  
Infiltration Assessment 0 1/28/2019  9:56 AM  
Dressing Status Clean, dry, & intact 1/28/2019  9:56 AM  
Dressing Type Transparent 1/28/2019  9:56 AM  
Hub Color/Line Status Pink 1/28/2019  9:56 AM  
        
Airway - Endotracheal Tube 01/28/19 Oral (Active) Dressing/Packing:  Wound Face-DRESSING TYPE: Adhesive wound closure strips (Steri-Strips)(Nasocort and drip pad) (01/28/19 5781) Splint/Cast:  ]

## 2019-01-28 NOTE — PERIOP NOTES
11:20 AM 
SPOKE TO PATIENT'S MOTHER (ZOLTAN GORDO GOMEZ) TO UPDATE HER ON PATIENT'S SURGICAL STATUS

## 2019-02-11 NOTE — OP NOTES
[address]  OPERATIVE REPORT    Name:  Fidencio Craig  MR#:  [id number]  :  1970  ACCOUNT #:  [de-identified]  DATE OF SERVICE:  2019    PREOPERATIVE DIAGNOSES:  1. Chronic recurrent sinusitis. 2.  Septal deviation. 3.  Turbinate hypertrophy. POSTOPERATIVE DIAGNOSES:  1. Chronic recurrent sinusitis. 2.  Septal deviation. 3.  Turbinate hypertrophy. PROCEDURES:  1.  Bilateral endoscopic maxillary antrostomy with tissue removal.  2.  Bilateral endoscopic partial ethmoidectomy. 3.  Septoplasty. 4.  Bilateral inferior turbinate reduction. SURGEON:  Beth Marie MD    ASSISTANT:  No assistant. ANESTHESIA:  General endotracheal tube anesthesia. COMPLICATIONS:  None. SPECIMENS REMOVED:  _____. IMPLANTS:  No implants. ESTIMATED BLOOD LOSS:  30 mL. INDICATIONS AND FINDINGS:  The patient had previous nasal trauma resulting in septal  deviation, related to dislocated septum following repair of a septal perforation,  nasal airway nonamenable to allergy management and its indications. The patient had  developed chronic recurrent sinusitis similarly not amenable to allergy management. CT scan imaging demonstrated chronic disease with mucoperiosteal thickening of  maxillary and anterior ethmoids with pinched ostiomeatal units. Additionally, there  were accessory ostia and Toshia cells blocking the ostiomeatal anatomy and hence  indications. DETAILS OF PROCEDURE:  In the operating room, the patient was induced under general  endotracheal tube anesthesia following which she was prepped and draped in the  sterile fashion. A 1% lidocaine with 1:100,000 part epinephrine was used for local  infiltration and infraorbital foramen block. Afrin soaked pledgets were placed  intranasally for hemostasis and decongestion through the procedure.     The septoplasty was first performed to allow access of the endoscope to the middle  meatus on the right at least.  A left-sided Katheen Holler incision was made elevating  bilateral submucoperichondrial leaflets dissecting off the maxillary crest into the  floor of the nose. Subluxed septum of the crest was resected with a Keren Ranks using a  [place] chisel to remove bony spurs. It was noted that the previous perforation repair  was avoided, not dissected to minimize risk of re-perforation. The septum was  repositioned in the midline of the crest and fixed with figure-of-eight suture of 3-0  Chromic. The bony cartilaginous junction was divided using a Tashia Freiberg to remove  deflected perpendicular plate bone and Duckbill to reposition perpendicular plate  bone back in the midline. Incision was closed with the mattress of 3-0 Chromic. Each middle turbinate was infractured to open up the middle meatus. The uncinate  process was avulsed and trimmed to its base with the microdebrider following this  into the nasofrontal recess on both sides. Accessory ostia were Identified clipping  the bridge between natural ostium and accessory ostium bilaterally with spread  through cut preserving the mucociliary highway. The ethmoid was entered inferomedially at the ethmoid bulla with a microdebrider,  coring out the honeycomb of ethmoid sinuses following the ground lamella medially and  lamina papyracea laterally, performing skull base dissection from posterior to  anterior in a _____ fashion removing Toshia cells, crimping the ostiomeatal units  anteriorly. On completion, nasal _____ was placed into each middle meatus as a  spacer. Stab incisions were made at base of each inferior turbinate coring out the  parenchyma of each inferior turbinate with the turbinate blade of the microdebrider  preserving the mucosa maximally for asceptic portions of turbinate bone. Each was  then outfractured to open up the nasal valve at this level. Bilateral Figueroa splints were applied, fixed to membranous septum with a silk suture.    The hypopharynx was suctioned and throat pack removed and the patient had been handed  off to Anesthesia for awakening and transfer to the recovery room.         Wesley Cook MD        [id number]  D:  02/10/2019 21:51  T:  02/11/2019 8:14  JOB #:  [id number]  CC:  [id number]  [facility]

## 2019-06-04 NOTE — DISCHARGE INSTRUCTIONS
Patient Education   Patient Education        Nasal Septum Repair: What to Expect at [address] may have some swelling of your nose, upper lip, cheeks, or around your eyes after nasal surgery. You may have some bruises around your nose and eyes. Your nose may be sore and will bleed. This may last for several days after surgery. The tip of your nose and your upper lip and gums may be numb. Feeling will return in a few weeks to a few months. Your sense of smell may not be as good after surgery. But it will improve and will often return to normal in 1 to 2 months. You will have a drip pad under your nose to collect mucus and blood. Change it only when it bleeds through. You may have to do this every hour for 24 hours after surgery. You will probably be able to return to work or school in a few days and to your normal routine in about 3 weeks. But this varies with your job and how much surgery you had. Most people recover fully in 1 to 2 months. You will have to visit your doctor during the 3 to 4 months after your surgery. Your doctor will check to see that your nose is healing well. This care sheet gives you a general idea about how long it will take for you to recover. But each person recovers at a different pace. Follow the steps below to get better as quickly as possible. How can you care for yourself at home? Activity    · Rest when you feel tired. Getting enough sleep will help you recover. Do not lie flat. Raise your head with two or three pillows. This can reduce swelling. Try to sleep on your back for the month after surgery. You can also sleep in a reclining chair.     · Try to walk each day. Start by walking a little more than you did the day before. Bit by bit, increase the amount you walk. Walking boosts blood flow and helps prevent pneumonia and constipation. Also, try to sit and stand as much as you can.     · For 1 week, try not to bend over or lift anything heavier than 10 pounds. Pt declined to participate in OT evaluation. This may include a child, heavy grocery bags and milk containers, a heavy briefcase or backpack, cat litter or dog food bags, or a vacuum .     · You can take a shower or bath. Avoid swimming for 6 weeks.     · Avoid strenuous activities, such as bicycle riding, jogging, weight lifting, or aerobic exercise, for 1 week or until your doctor says it is okay.     · You may drive when you are no longer taking prescription pain pills and feel up to it. Diet    · You can eat your normal diet. If your stomach is upset, try bland, low-fat foods like plain rice, broiled chicken, toast, and yogurt.     · You may notice that your bowel movements are not regular right after your surgery. This is common. Try to avoid constipation and straining with bowel movements. You may want to take a fiber supplement every day. If you have not had a bowel movement after a couple of days, ask your doctor about taking a mild laxative. Medicines    · Your doctor will tell you if and when you can restart your medicines. He or she will also give you instructions about taking any new medicines.     · If you take blood thinners, such as warfarin (Coumadin), clopidogrel (Plavix), or aspirin, be sure to talk to your doctor. He or she will tell you if and when to start taking those medicines again. Make sure that you understand exactly what your doctor wants you to do.     · Do not take aspirin, aspirin-containing medicines, or anti-inflammatory medicines such as ibuprofen (Advil, Motrin) or naproxen (Aleve) for 3 weeks following surgery unless your doctor says it is okay.     · Take pain medicines exactly as directed. ? If the doctor gave you a prescription medicine for pain, take it as prescribed. ? Do not take two or more pain medicines at the same time unless the doctor told you to. Many pain medicines have acetaminophen, which is Tylenol.  Too much acetaminophen (Tylenol) can be harmful.     · If your doctor prescribed antibiotics, take them as directed. Do not stop taking them just because you feel better. You need to take the full course of antibiotics.     · If you think your pain medicine is making you sick to your stomach:  ? Take your medicine after meals (unless your doctor has told you not to). ? Ask your doctor for a different pain medicine. Incision care    · You will have a drip pad under your nose to collect blood. Change it only when it has bled through. You may have to do this every hour for 24 hours after surgery. When bleeding stops, you can remove it.     · If you have packing in your nose, leave it in. Your doctor will take it out. Ice and elevation    · To help with swelling and pain, put ice or a cold pack on your nose for 10 to 20 minutes at a time. Put a thin cloth between the ice and your skin.     · Sleep with your head raised up. You can also sleep in a reclining chair. Other instructions    · Do not blow your nose for 1 week after surgery.     · Do not put anything into your nose.     · If you must sneeze, open your mouth and sneeze naturally.     · After any packing is removed, use saline (saltwater) nasal washes to help keep your nasal passages open and wash out mucus and bacteria. You can buy saline nose drops at a grocery store or drugstore. Or you can make your own at home by adding 1 teaspoon of salt and 1 teaspoon of baking soda to 2 cups of distilled water. If you make your own, fill a bulb syringe with the solution, insert the tip into your nostril, and squeeze gently. Beata Goad your nose.     · You can wear your glasses when you wish. Do not wear contacts until the day after the surgery. Follow-up care is a key part of your treatment and safety. Be sure to make and go to all appointments, and call your doctor if you are having problems. It's also a good idea to know your test results and keep a list of the medicines you take. When should you call for help?   Call 911 anytime you think you may need emergency care. For example, call if:    · You passed out (lost consciousness).     · You have severe trouble breathing.    Call your doctor now or seek immediate medical care if:    · You have bleeding through the nasal packing that is not slowing.     · You have symptoms of infection, such as:  ? Increased pain, swelling, warmth, or redness. ? Red streaks coming from the area. ? Pus draining from the area. ? A fever.     · You have pain that does not get better after you take pain pills.    Watch closely for any changes in your health, and be sure to contact your doctor if:    · You do not get better as expected. Where can you learn more? Go to http://janet-shay.info/. Enter V657 in the search box to learn more about \"Nasal Septum Repair: What to Expect at Home. \"  Current as of: March 27, 2018  Content Version: 11.9  © 1005-5872 [employer]. Care instructions adapted under license by [employer] (which disclaims liability or warranty for this information). If you have questions about a medical condition or this instruction, always ask your healthcare professional. [place] [address] any warranty or liability for your use of this information. Endoscopic Sinus Surgery: What to Expect at [address] will have a drip pad under your nose to collect mucus and blood. Change it only when it bleeds through. You may have to do this every hour for 24 hours after surgery. You may have some swelling of your nose, upper lip, cheeks, or around your eyes. Your nose may be sore and will bleed. You may feel \"stuffed up\" like you have a bad head cold. This will last for several days after surgery. The tip of your nose and your upper lip and gums may be numb. Feeling will return in a few weeks to a few months. Your sense of smell will not be as good after surgery. It will improve and probably return to normal in 1 to 2 months.   You will probably be able to return to work or school in about 1 week and to your normal routine in about 3 weeks. However, this varies with your job and the extent of your surgery. Most people feel normal in 1 to 2 months. You will have to visit your doctor regularly for 3 to 4 months after your surgery. Your doctor will check to see that your sinuses are healing well. This care sheet gives you a general idea about how long it will take for you to recover. But each person recovers at a different pace. Follow the steps below to get better as quickly as possible. How can you care for yourself at home? Activity    · Rest when you feel tired. Getting enough sleep will help you recover. Do not lie flat. Raise your head with three or four pillows. This can reduce swelling. Try to sleep on your back during the month after surgery. You can also sleep in a reclining chair.     · Try to walk each day. Start by walking a little more than you did the day before. Bit by bit, increase the amount you walk. Walking boosts blood flow and helps prevent pneumonia and constipation. Also, try to sit and stand as much as you can.     · For 1 week, try not to bend over or lift anything heavier than 10 pounds. This may include a child, heavy grocery bags and milk containers, a heavy briefcase or backpack, cat litter or dog food bags, or a vacuum .     · You can take a shower or bath. Use lukewarm, not hot water. Avoid swimming for 6 weeks.     · Avoid sawdust, chemicals, and excessive dust for 4 weeks.     · Avoid strenuous activities, such as biking, jogging, weight lifting, or aerobic exercise, for 1 week and then ease back into these activities over 2 to 3 weeks.     · You may drive when you are no longer taking prescription pain medicine and feel up to it.     · You will probably be able to return to work or school in about 1 week and to your normal routine in about 3 weeks.  However, this varies with your job and the extent of your surgery. Diet    · You can eat your normal diet. If your stomach is upset, try bland, low-fat foods like plain rice, broiled chicken, toast, and yogurt.     · You may notice that your bowel movements are not regular right after your surgery. This is common. Try to avoid constipation and straining with bowel movements. You may want to take a fiber supplement every day. If you have not had a bowel movement after a couple of days, ask your doctor about taking a mild laxative. Medicines    · Your doctor will tell you if and when you can restart your medicines. He or she will also give you instructions about taking any new medicines.     · If you take blood thinners, such as warfarin (Coumadin), clopidogrel (Plavix), or aspirin, be sure to talk to your doctor. He or she will tell you if and when to start taking those medicines again. Make sure that you understand exactly what your doctor wants you to do.     · Do not take aspirin, aspirin-containing medicines, or anti-inflammatory medicines such as ibuprofen (Advil, Motrin) or naproxen (Aleve) for 3 weeks following surgery unless your doctor says it is okay.     · Take pain medicines exactly as directed. ? If the doctor gave you a prescription medicine for pain, take it as prescribed. ? Do not take two or more pain medicines at the same time unless the doctor told you to. Many pain medicines have acetaminophen, which is Tylenol. Too much acetaminophen (Tylenol) can be harmful.     · If your doctor prescribed antibiotics, take them as directed. Do not stop taking them just because you feel better. You need to take the full course of antibiotics.     · If you think your pain medicine is making you sick to your stomach:  ? Take your medicine after meals (unless your doctor has told you not to). ? Ask your doctor for a different pain medicine. Incision care    · You probably will not have an incision (cut). You will have a drip pad under your nose to collect blood. Change it only when it has bled through. You may have to do this every hour for 24 hours after surgery. When bleeding stops, you can remove it.     · If you have packing in your nose, leave it in. Your doctor will take it out. Ice and elevation    · To help with swelling and pain, put ice or a cold pack on your nose for 10 to 20 minutes at a time. Put a thin cloth between the ice and your skin.     · Do not sleep flat. Sleep with your head raised up. You can also sleep in a reclining chair. Other instructions    · Do not blow your nose for 2 weeks.     · Do not put anything into your nose.     · If you must sneeze, open your mouth and sneeze naturally.     · Keep your mouth clean. Rinse your mouth with salt water or an alcohol-free mouthwash after each meal and before bedtime.     · After any packing is removed, use saline (saltwater) nasal washes to help keep your nasal passages open and wash out mucus and bacteria. You can buy saline nose drops at a grocery store or drugstore.     · You can wear your glasses when you wish. Do not wear contacts until the day after the surgery.     · Do not travel by airplane for at least 2 weeks. Your sinuses are still healing, and the changes in air pressure can affect them. Follow-up care is a key part of your treatment and safety. Be sure to make and go to all appointments, and call your doctor if you are having problems. It's also a good idea to know your test results and keep a list of the medicines you take. When should you call for help? Call 911 anytime you think you may need emergency care. For example, call if:    · You passed out (lost consciousness).     · You have severe trouble breathing.     · You have chest pain, have shortness of breath, or you cough up blood.    Call your doctor now or seek immediate medical care if:    · You have signs of infection, such as:  ? Increased pain, swelling, warmth, or redness. ? Red streaks leading from the incision. ?  Pus draining from the incision. ? A fever.     · You bleed through the bandage.     · You have symptoms of a blood clot in your leg (called a deep vein thrombosis), such as:  ? Pain in the calf, back of the knee, thigh, or groin. ? Redness and swelling in your leg or groin.     · You have pain that does not get better after you take pain medicine.    Watch closely for changes in your health, and be sure to contact your doctor if:    · You do not get better as expected. Where can you learn more? Go to http://janet-shay.info/. Enter J364 in the search box to learn more about \"Endoscopic Sinus Surgery: What to Expect at Home. \"  Current as of: March 27, 2018  Content Version: 11.9  © 3796-1089 [employer]. Care instructions adapted under license by [employer] (which disclaims liability or warranty for this information). If you have questions about a medical condition or this instruction, always ask your healthcare professional. [address] any warranty or liability for your use of this information. ______________________________________________________________________    Anesthesia Discharge Instructions    After general anesthesia or intervenous sedation, for 24 hours or while taking prescription Narcotics:  · Limit your activities  · Do not drive or operate hazardous machinery  · If you have not urinated within 8 hours after discharge, please contact your surgeon on call.   · Do not make important personal or business decisions  · Do not drink alcoholic beverages    Report the following to your surgeon:  · Excessive pain, swelling, redness or odor of or around the surgical area  · Temperature over 100.5 degrees  · Nausea and vomiting lasting longer than 4 hours or if unable to take medication  · Any signs of decreased circulation or nerve impairment to extremity:  Change in color, persistent numbness, tingling, coldness or increased pain.  · Any questions

## (undated) DEVICE — CODMAN® SURGICAL PATTIES 1" X 3" (2.54CM X 7.62CM): Brand: CODMAN®

## (undated) DEVICE — SOLUTION IRRIG 1000ML H2O STRL BLT

## (undated) DEVICE — KENDALL SCD EXPRESS SLEEVES, KNEE LENGTH, MEDIUM: Brand: KENDALL SCD

## (undated) DEVICE — SURGICAL PROCEDURE PACK BASIN MAJ SET CUST NO CAUT

## (undated) DEVICE — TOWEL SURG W17XL27IN STD BLU COT NONFENESTRATED PREWASHED

## (undated) DEVICE — SOLUTION IV 250ML 0.9% SOD CHL CLR INJ FLX BG CONT PRT CLSR

## (undated) DEVICE — 1200 GUARD II KIT W/5MM TUBE W/O VAC TUBE: Brand: GUARDIAN

## (undated) DEVICE — PACK,EENT,TURBAN DRAPE,PK II: Brand: MEDLINE

## (undated) DEVICE — Device

## (undated) DEVICE — DEVON™ KNEE AND BODY STRAP 60" X 3" (1.5 M X 7.6 CM): Brand: DEVON

## (undated) DEVICE — SOLUTION SCRB 4OZ 10% PVP I POVIDONE IOD TOP PAINT EXIDINE

## (undated) DEVICE — GOWN,AURORA,NON-REINFORCED,2XL: Brand: MEDLINE

## (undated) DEVICE — COTTON BALLS: Brand: DEROYAL

## (undated) DEVICE — NEEDLE HYPO 25GA L1.5IN BLU POLYPR HUB S STL REG BVL STR

## (undated) DEVICE — PACKING 8004008 NEURAY 200PK 25X76MM: Brand: NEURAY ®

## (undated) DEVICE — EYE PADSSTERILENOT MADE WITH NATURAL RUBBER LATEXSINGLE USE ONLYDO NOT USE IF PACKAGE OPENED OR DAMAGED: Brand: CARDINAL HEALTH

## (undated) DEVICE — SUTURE CHROMIC GUT SZ 3-0 L27IN ABSRB BRN L19MM FS-2 3/8 636H

## (undated) DEVICE — SOLUTION IV 1000ML 0.9% SOD CHL

## (undated) DEVICE — PREP SKN CHLROXYLNL 3% 8.5OZ --

## (undated) DEVICE — STERILE POLYISOPRENE POWDER-FREE SURGICAL GLOVES: Brand: PROTEXIS

## (undated) DEVICE — SOL ANTI-FOG 6ML MEDC -- MEDICHOICE - CONVERT TO 358427

## (undated) DEVICE — SPLINT 1529010 10PK THERMASPLINT MEDIUM

## (undated) DEVICE — SYR 10ML CTRL LR LCK NSAF LF --

## (undated) DEVICE — INTENDED FOR TISSUE SEPARATION, AND OTHER PROCEDURES THAT REQUIRE A SHARP SURGICAL BLADE TO PUNCTURE OR CUT.: Brand: BARD-PARKER ® CARBON RIB-BACK BLADES

## (undated) DEVICE — NEEDLE HYPO 27GA L1.25IN GRY POLYPR HUB S STL REG BVL STR

## (undated) DEVICE — INFECTION CONTROL KIT SYS

## (undated) DEVICE — TELFA NON-ADHERENT ABSORBENT DRESSING: Brand: TELFA

## (undated) DEVICE — APPLICATOR FBR TIP L6IN COT TIP WOOD SHFT SWAB 2000 PER CA

## (undated) DEVICE — ROCKER SWITCH PENCIL BLADE ELECTRODE, HOLSTER: Brand: EDGE

## (undated) DEVICE — X-RAY SPONGES,16 PLY: Brand: DERMACEA

## (undated) DEVICE — SUTURE PDS II SZ 5-0 L18IN ABSRB UD P-3 L13MM 3/8 CIR PRIM Z493G

## (undated) DEVICE — FIRM 4CM: Brand: NASOPORE

## (undated) DEVICE — MEDI-VAC NON-CONDUCTIVE SUCTION TUBING: Brand: CARDINAL HEALTH

## (undated) DEVICE — BLADE 1882040 5PK INFERIOR TURB 2MM

## (undated) DEVICE — BLADE 1884004 TRICUT 5PK 4MM: Brand: TRICUT®